# Patient Record
Sex: FEMALE | Race: WHITE | Employment: OTHER | URBAN - METROPOLITAN AREA
[De-identification: names, ages, dates, MRNs, and addresses within clinical notes are randomized per-mention and may not be internally consistent; named-entity substitution may affect disease eponyms.]

---

## 2017-01-13 RX ORDER — DULOXETIN HYDROCHLORIDE 30 MG/1
CAPSULE, DELAYED RELEASE ORAL
Qty: 30 CAP | Refills: 5 | Status: SHIPPED | OUTPATIENT
Start: 2017-01-13 | End: 2017-07-08 | Stop reason: SDUPTHER

## 2017-07-08 ENCOUNTER — OFFICE VISIT (OUTPATIENT)
Dept: NEUROLOGY | Age: 82
End: 2017-07-08

## 2017-07-08 VITALS
BODY MASS INDEX: 26.2 KG/M2 | DIASTOLIC BLOOD PRESSURE: 82 MMHG | SYSTOLIC BLOOD PRESSURE: 130 MMHG | OXYGEN SATURATION: 97 % | TEMPERATURE: 98.7 F | HEART RATE: 72 BPM | WEIGHT: 163 LBS | RESPIRATION RATE: 20 BRPM | HEIGHT: 66 IN

## 2017-07-08 DIAGNOSIS — M79.2 NEUROPATHIC PAIN: Primary | ICD-10-CM

## 2017-07-08 DIAGNOSIS — G60.9 HEREDITARY AND IDIOPATHIC PERIPHERAL NEUROPATHY: ICD-10-CM

## 2017-07-08 RX ORDER — DULOXETIN HYDROCHLORIDE 30 MG/1
CAPSULE, DELAYED RELEASE ORAL
Qty: 30 CAP | Refills: 11 | Status: SHIPPED | OUTPATIENT
Start: 2017-07-08 | End: 2018-05-02 | Stop reason: SDUPTHER

## 2017-07-08 NOTE — MR AVS SNAPSHOT
Visit Information Date & Time Provider Department Dept. Phone Encounter #  
 7/8/2017 10:45 AM Jon Madison MD [facility] [phone] [id number] Your Appointments 7/9/2018 11:40 AM  
Follow Up with Jon Madison MD  
[facility]) Appt Note: follow up neuropathy  $0CP  dariusz  7/8/17 [place] [address] [facility] [address] [place] T[place]ar [id number] [phone]  
  
   
 [place] [address] [address] [address] [place] Upcoming Health Maintenance Date Due  
 LIPID PANEL Q1 7/23/1933 FOOT EXAM Q1 7/23/1943 MICROALBUMIN Q1 7/23/1943 EYE EXAM RETINAL OR DILATED Q1 7/23/1943 DTaP/Tdap/Td series (1 - Tdap) 7/23/1954 ZOSTER VACCINE AGE 60> 7/23/1993 GLAUCOMA SCREENING Q2Y 7/23/1998 OSTEOPOROSIS SCREENING (DEXA) 7/23/1998 Pneumococcal 65+ Low/Medium Risk (1 of 2 - PCV13) 7/23/1998 MEDICARE YEARLY EXAM 7/23/1998 HEMOGLOBIN A1C Q6M 8/15/2013 INFLUENZA AGE 9 TO ADULT 8/1/2017 Allergies as of 7/8/2017  Review Complete On: 7/8/2017 By: Jon Madison MD  
  
 Severity Noted Reaction Type Reactions Novocain [Procaine]  06/27/2014    Other (comments) FAINTS Current Immunizations  Never Reviewed No immunizations on file. Not reviewed this visit Vitals BP Pulse Temp Resp Height(growth percentile) Weight(growth percentile) 130/82 72 98.7 °F (37.1 °C) (Oral) 20 5' 5.5\" (1.664 m) 163 lb (73.9 kg) SpO2 BMI OB Status Smoking Status 97% 26.71 kg/m2 Postmenopausal Former Smoker Vitals History BMI and BSA Data Body Mass Index Body Surface Area  
 26.71 kg/m 2 1.85 m 2 Preferred Pharmacy Pharmacy Name Phone [facility] [address], [address] Your Updated Medication List  
  
   
This list is accurate as of: 7/8/17 11:08 AM.  Always use your most recent med list.  
  
  
  
  
 aspirin delayed-release 81 mg tablet Take 81 mg by mouth daily. CALCIUM PO Take  by mouth as directed. COZAAR 100 mg tablet Generic drug:  losartan Take 100 mg by mouth daily. CRESTOR 20 mg tablet Generic drug:  rosuvastatin Take 20 mg by mouth nightly. diclofenac 1 % Gel Commonly known as:  VOLTAREN Apply  to affected area four (4) times daily. DULoxetine 30 mg capsule Commonly known as:  CYMBALTA TAKE 1 CAP BY MOUTH DAILY. Indications: NEUROPATHIC PAIN  
  
 FISH OIL 1,000 mg Cap Generic drug:  omega-3 fatty acids-vitamin e Take 1 Cap by mouth every seven (7) days. gabapentin 300 mg capsule Commonly known as:  NEURONTIN  
1 WITH BREAKFAST, 1 WITH LUNCH, 2 WITH DINNER AND 2 AT BEDTIME  
  
 hydroCHLOROthiazide 12.5 mg tablet Commonly known as:  HYDRODIURIL Take 12.5 mg by mouth daily. l-methylfolate-vit b12-vit b6 2.8-2-25 mg Tab Commonly known as:  Lucia [place] Take 2.8 mg by mouth two (2) times a day. NexIUM 40 mg capsule Generic drug:  esomeprazole Take 40 mg by mouth daily. VITAMIN D3 1,000 unit tablet Generic drug:  cholecalciferol Take 1,000 Units by mouth Before breakfast, lunch, and dinner. Patient Instructions PRESCRIPTION REFILL POLICY [facility] Statement to Patients April 1, 2014 In an effort to ensure the large volume of patient prescription refills is processed in the most efficient and expeditious manner, we are asking our patients to assist us by calling your Pharmacy for all prescription refills, this will include also your  Mail Order Pharmacy. The pharmacy will contact our office electronically to continue the refill process. Please do not wait until the last minute to call your pharmacy. We need at least 48 hours (2days) to fill prescriptions.  We also encourage you to call your pharmacy before going to  your prescription to make sure it is ready. With regard to controlled substance prescription refill requests (narcotic refills) that need to be picked up at our office, we ask your cooperation by providing us with at least 72 hours (3days) notice that you will need a refill. We will not refill narcotic prescription refill requests after 4:00pm on any weekday, Monday through Thursday, or after 2:00pm on Fridays, or on the weekends. We encourage everyone to explore another way of getting your prescription refill request processed using pinion-pins, our patient web portal through our electronic medical record system. pinion-pins is an efficient and effective way to communicate your medication request directly to the office and  downloadable as an teodora on your smart phone . pinion-pins also features a review functionality that allows you to view your medication list as well as leave messages for your physician. Are you ready to get connected? If so please review the attatched instructions or speak to any of our staff to get you set up right away! Thank you so much for your cooperation. Should you have any questions please contact our Practice Administrator. The Physicians and Staff,  [facility] Yamilet Whitehead [address] What is a living will? A living will is a legal form you use to write down the kind of care you want at the end of your life. It is used by the health professionals who will treat you if you aren't able to decide for yourself. If you put your wishes in writing, your loved ones and others will know what kind of care you want. They won't need to guess. This can ease your mind and be helpful to others. A living will is not the same as an estate or property will. An estate will explains what you want to happen with your money and property after you die. Is a living will a legal document? A living will is a legal document. Each state has its own laws about living pineda. If you move to another state, make sure that your living will is legal in the state where you now live. Or you might use a universal form that has been approved by many states. This kind of form can sometimes be completed and stored online. Your electronic copy will then be available wherever you have a connection to the Internet. In most cases, doctors will respect your wishes even if you have a form from a different state. · You don't need an  to complete a living will. But legal advice can be helpful if your state's laws are unclear, your health history is complicated, or your family can't agree on what should be in your living will. · You can change your living will at any time. Some people find that their wishes about end-of-life care change as their health changes. · In addition to making a living will, think about completing a medical power of  form. This form lets you name the person you want to make end-of-life treatment decisions for you (your \"health care agent\") if you're not able to. Many hospitals and nursing homes will give you the forms you need to complete a living will and a medical power of . · Your living will is used only if you can't make or communicate decisions for yourself anymore. If you become able to make decisions again, you can accept or refuse any treatment, no matter what you wrote in your living will. · Your state may offer an online registry. This is a place where you can store your living will online so the doctors and nurses who need to treat you can find it right away. What should you think about when creating a living will? Talk about your end-of-life wishes with your family members and your doctor. Let them know what you want. That way the people making decisions for you won't be surprised by your choices. Think about these questions as you make your living will: · Do you know enough about life support methods that might be used? If not, talk to your doctor so you know what might be done if you can't breathe on your own, your heart stops, or you're unable to swallow. · What things would you still want to be able to do after you receive life-support methods? Would you want to be able to walk? To speak? To eat on your own? To live without the help of machines? · If you have a choice, where do you want to be cared for? In your home? At a hospital or nursing home? · Do you want certain Sikh practices performed if you become very ill? · If you have a choice at the end of your life, where would you prefer to die? At home? In a hospital or nursing home? Somewhere else? · Would you prefer to be buried or cremated? · Do you want your organs to be donated after you die? What should you do with your living will? · Make sure that your family members and your health care agent have copies of your living will. · Give your doctor a copy of your living will to keep in your medical record. If you have more than one doctor, make sure that each one has a copy. · You may want to put a copy of your living will where it can be easily found. Where can you learn more? Go to http://natalie-celeste.info/. Enter G177 in the search box to learn more about \"Learning About Living Lorenzo White. \" Current as of: August 8, 2016 Content Version: 11.3 © 2794-3872 [employer]. Care instructions adapted under license by [employer] (which disclaims liability or warranty for this information). If you have questions about a medical condition or this instruction, always ask your healthcare professional. [address] any warranty or liability for your use of this information. Introducing [facility]!    
 [facility] introduces Helmedix patient portal. Now you can access parts of your medical record, email your doctor's office, and request medication refills online. 1. In your internet browser, go to [email or web address] 2. Click on the First Time User? Click Here link in the Sign In box. You will see the New Member Sign Up page. 3. Enter your SpazioDati Access Code exactly as it appears below. You will not need to use this code after youve completed the sign-up process. If you do not sign up before the expiration date, you must request a new code. · SpazioDati Access Code: [email or web address] Expires: 10/6/2017 10:22 AM 
 
4. Enter the last four digits of your Social Security Number (xxxx) and Date of Birth (mm/dd/yyyy) as indicated and click Submit. You will be taken to the next sign-up page. 5. Create a SpazioDati ID. This will be your SpazioDati login ID and cannot be changed, so think of one that is secure and easy to remember. 6. Create a SpazioDati password. You can change your password at any time. 7. Enter your Password Reset Question and Answer. This can be used at a later time if you forget your password. 8. Enter your e-mail address. You will receive e-mail notification when new information is available in [address] 9. Click Sign Up. You can now view and download portions of your medical record. 10. Click the Download Summary menu link to download a portable copy of your medical information. If you have questions, please visit the Frequently Asked Questions section of the SpazioDati website. Remember, SpazioDati is NOT to be used for urgent needs. For medical emergencies, dial 911. Now available from your iPhone and Android! Please provide this summary of care documentation to your next provider. Your primary care clinician is listed as Arik May. If you have any questions after today's visit, please call [phone].

## 2017-07-08 NOTE — PROGRESS NOTES
[address] [facility]   [place] [address]   [address]   [phone] Office    Fax         PT SEEN AT [facility]      Chief Complaint   Patient presents with    Peripheral Neuropathy     BLE numbness/tingling continues    Shoulder Pain     left shoulder     Current Outpatient Prescriptions   Medication Sig Dispense Refill    DULoxetine (CYMBALTA) 30 mg capsule TAKE 1 CAP BY MOUTH DAILY. 30 Cap 0    gabapentin (NEURONTIN) 300 mg capsule 1 WITH BREAKFAST, 1 WITH LUNCH, 2 WITH DINNER AND 2 AT BEDTIME 180 Cap 10    diclofenac (VOLTAREN) 1 % gel Apply  to affected area four (4) times daily. 2 Each 0    esomeprazole (NEXIUM) 40 mg capsule Take 40 mg by mouth daily.  aspirin delayed-release 81 mg tablet Take 81 mg by mouth daily.  rosuvastatin (CRESTOR) 20 mg tablet Take 20 mg by mouth nightly.  cholecalciferol (VITAMIN D3) 1,000 unit tablet Take 1,000 Units by mouth Before breakfast, lunch, and dinner.  Hydrochlorothiazide 12.5 mg tablet Take 12.5 mg by mouth daily.  losartan (COZAAR) 100 mg tablet Take 100 mg by mouth daily.  l-methylfolate-vit b12-vit b6 (METANX) 2.8-2-25 mg Tab Take 2.8 mg by mouth two (2) times a day. 24 Tab 11    CALCIUM PO Take  by mouth as directed.  omega-3 fatty acids-vitamin e (FISH OIL) 1,000 mg cap Take 1 Cap by mouth every seven (7) days. Allergies   Allergen Reactions    Novocain [Procaine] Other (comments)     FAINTS     Social History   Substance Use Topics    Smoking status: Former Smoker     Packs/day: 1.50     Years: 40.00     Quit date: 6/27/2000    Smokeless tobacco: Never Used    Alcohol use 0.5 oz/week     1 Glasses of wine per week     Mrs. Arvin Guan returns today for follow-up painful peripheral neuropathy. She has been maintained on a combination of Neurontin and Cymbalta for symptomatic relief.   On her last visit she was doing well for control of her symptoms. She also has some Voltaren gel. The Neurontin has in the past made her a bit groggy. She has been trying to decrease the dose of that somewhat. She was taking 5 daily. She returns today noting her symptoms are about the same. Overall she is doing quite well. Examination  Visit Vitals    Ht 5' 5.5\" (1.664 m)    Wt 73.9 kg (163 lb)    BMI 26.71 kg/m2     She is a very nice lady. She is awake alert oriented conversant. We had a nice conversation today. She has no motor focality. Her cranial nerves are intact. Cognitively she is completely intact. That she has graded sensory loss to above the ankles and that is consistent with previous exams. Her gait is steady. She has no ataxia. Impression/Plan  Neuropathic pain secondary to peripheral neuropathy. Her symptoms are controlled and she has been able to decrease her amount of Neurontin which is good. She is tolerating this as well as the Cymbalta. She also has some Voltaren gel if needed. We will continue this. Follow in 1 year unless circumstances dictate otherwise per      This note was created using voice recognition software. Despite editing, there may be syntax errors. This note will not be viewable in [address]

## 2017-07-08 NOTE — PATIENT INSTRUCTIONS
[facility]   Statement to Patients  April 1, 2014      In an effort to ensure the large volume of patient prescription refills is processed in the most efficient and expeditious manner, we are asking our patients to assist us by calling your Pharmacy for all prescription refills, this will include also your  Mail Order Pharmacy. The pharmacy will contact our office electronically to continue the refill process. Please do not wait until the last minute to call your pharmacy. We need at least 48 hours (2days) to fill prescriptions. We also encourage you to call your pharmacy before going to  your prescription to make sure it is ready. With regard to controlled substance prescription refill requests (narcotic refills) that need to be picked up at our office, we ask your cooperation by providing us with at least 72 hours (3days) notice that you will need a refill. We will not refill narcotic prescription refill requests after 4:00pm on any weekday, Monday through Thursday, or after 2:00pm on Fridays, or on the weekends. We encourage everyone to explore another way of getting your prescription refill request processed using MightyHive, our patient web portal through our electronic medical record system. MightyHive is an efficient and effective way to communicate your medication request directly to the office and  downloadable as an teodora on your smart phone . MightyHive also features a review functionality that allows you to view your medication list as well as leave messages for your physician. Are you ready to get connected? If so please review the attatched instructions or speak to any of our staff to get you set up right away! Thank you so much for your cooperation. Should you have any questions please contact our Practice Administrator.     The Physicians and Staff,  [facility] [address]  What is a living will?  A living will is a legal form you use to write down the kind of care you want at the end of your life. It is used by the health professionals who will treat you if you aren't able to decide for yourself. If you put your wishes in writing, your loved ones and others will know what kind of care you want. They won't need to guess. This can ease your mind and be helpful to others. A living will is not the same as an estate or property will. An estate will explains what you want to happen with your money and property after you die. Is a living will a legal document? A living will is a legal document. Each state has its own laws about living pineda. If you move to another state, make sure that your living will is legal in the state where you now live. Or you might use a universal form that has been approved by many states. This kind of form can sometimes be completed and stored online. Your electronic copy will then be available wherever you have a connection to the Internet. In most cases, doctors will respect your wishes even if you have a form from a different state. · You don't need an  to complete a living will. But legal advice can be helpful if your state's laws are unclear, your health history is complicated, or your family can't agree on what should be in your living will. · You can change your living will at any time. Some people find that their wishes about end-of-life care change as their health changes. · In addition to making a living will, think about completing a medical power of  form. This form lets you name the person you want to make end-of-life treatment decisions for you (your \"health care agent\") if you're not able to. Many hospitals and nursing homes will give you the forms you need to complete a living will and a medical power of . · Your living will is used only if you can't make or communicate decisions for yourself anymore.  If you become able to make decisions again, you can accept or refuse any treatment, no matter what you wrote in your living will. · Your state may offer an online registry. This is a place where you can store your living will online so the doctors and nurses who need to treat you can find it right away. What should you think about when creating a living will? Talk about your end-of-life wishes with your family members and your doctor. Let them know what you want. That way the people making decisions for you won't be surprised by your choices. Think about these questions as you make your living will:  · Do you know enough about life support methods that might be used? If not, talk to your doctor so you know what might be done if you can't breathe on your own, your heart stops, or you're unable to swallow. · What things would you still want to be able to do after you receive life-support methods? Would you want to be able to walk? To speak? To eat on your own? To live without the help of machines? · If you have a choice, where do you want to be cared for? In your home? At a hospital or nursing home? · Do you want certain Faith practices performed if you become very ill? · If you have a choice at the end of your life, where would you prefer to die? At home? In a hospital or nursing home? Somewhere else? · Would you prefer to be buried or cremated? · Do you want your organs to be donated after you die? What should you do with your living will? · Make sure that your family members and your health care agent have copies of your living will. · Give your doctor a copy of your living will to keep in your medical record. If you have more than one doctor, make sure that each one has a copy. · You may want to put a copy of your living will where it can be easily found. Where can you learn more? Go to http://natalie-celeste.info/. Enter H877 in the search box to learn more about \"Learning About Living Perradha. \"  Current as of: August 8, 2016  Content Version: 11.3  © 6197-1036 [employer]. Care instructions adapted under license by [employer] (which disclaims liability or warranty for this information). If you have questions about a medical condition or this instruction, always ask your healthcare professional. [address] any warranty or liability for your use of this information.

## 2017-07-08 NOTE — PROGRESS NOTES
[address] [facility]   [place] [address] [facility] [address]   Mika Khanna 57    Office    Fax         PT SEEN AT [facility]      Chief Complaint   Patient presents with    Peripheral Neuropathy     BLE numbness/tingling continues    Shoulder Pain     left shoulder     Current Outpatient Prescriptions   Medication Sig Dispense Refill    DULoxetine (CYMBALTA) 30 mg capsule TAKE 1 CAP BY MOUTH DAILY. 30 Cap 0    gabapentin (NEURONTIN) 300 mg capsule 1 WITH BREAKFAST, 1 WITH LUNCH, 2 WITH DINNER AND 2 AT BEDTIME 180 Cap 10    diclofenac (VOLTAREN) 1 % gel Apply  to affected area four (4) times daily. 2 Each 0    esomeprazole (NEXIUM) 40 mg capsule Take 40 mg by mouth daily.  aspirin delayed-release 81 mg tablet Take 81 mg by mouth daily.  rosuvastatin (CRESTOR) 20 mg tablet Take 20 mg by mouth nightly.  cholecalciferol (VITAMIN D3) 1,000 unit tablet Take 1,000 Units by mouth Before breakfast, lunch, and dinner.  Hydrochlorothiazide 12.5 mg tablet Take 12.5 mg by mouth daily.  losartan (COZAAR) 100 mg tablet Take 100 mg by mouth daily.  l-methylfolate-vit b12-vit b6 (METANX) 2.8-2-25 mg Tab Take 2.8 mg by mouth two (2) times a day. 24 Tab 11    CALCIUM PO Take  by mouth as directed.  omega-3 fatty acids-vitamin e (FISH OIL) 1,000 mg cap Take 1 Cap by mouth every seven (7) days. Allergies   Allergen Reactions    Novocain [Procaine] Other (comments)     FAINTS     Social History   Substance Use Topics    Smoking status: Former Smoker     Packs/day: 1.50     Years: 40.00     Quit date: 6/27/2000    Smokeless tobacco: Never Used    Alcohol use 0.5 oz/week     1 Glasses of wine per week     Mrs. Libby Louise returns today for follow-up painful peripheral neuropathy. She has been maintained on a combination of Neurontin and Cymbalta for symptomatic relief.   On her last visit she was doing well for control of her symptoms. She returns today noting    Examination  Visit Vitals    Ht 5' 5.5\" (1.664 m)    Wt 73.9 kg (163 lb)    BMI 26.71 kg/m2         Impression/Plan  Neuropathic pain secondary to peripheral neuropathy. This note was created using voice recognition software. Despite editing, there may be syntax errors. This note will not be viewable in [address]

## 2018-02-23 RX ORDER — GABAPENTIN 300 MG/1
CAPSULE ORAL
Qty: 540 CAP | Refills: 2 | Status: SHIPPED | OUTPATIENT
Start: 2018-02-23 | End: 2018-08-21 | Stop reason: SDUPTHER

## 2018-05-02 RX ORDER — DULOXETIN HYDROCHLORIDE 30 MG/1
CAPSULE, DELAYED RELEASE ORAL
Qty: 90 CAP | Refills: 0 | Status: SHIPPED | OUTPATIENT
Start: 2018-05-02 | End: 2018-07-09 | Stop reason: SDUPTHER

## 2018-06-28 ENCOUNTER — TELEPHONE (OUTPATIENT)
Dept: NEUROLOGY | Age: 83
End: 2018-06-28

## 2018-06-28 NOTE — TELEPHONE ENCOUNTER
Left message to call , need to verify that refills are needed for  Cymbalta HCL 30 mg  , last refill was 5/18 , 90 days

## 2018-07-09 RX ORDER — DULOXETIN HYDROCHLORIDE 30 MG/1
CAPSULE, DELAYED RELEASE ORAL
Qty: 90 CAP | Refills: 0 | Status: SHIPPED | OUTPATIENT
Start: 2018-07-09 | End: 2018-07-16 | Stop reason: SDUPTHER

## 2018-07-09 NOTE — TELEPHONE ENCOUNTER
VO Cymbalta 30 mg cap , 90 , 0 refills.  Pt was last seen on 7/8/17 and pt has up coming appt on 7/16/18

## 2018-07-16 ENCOUNTER — OFFICE VISIT (OUTPATIENT)
Dept: NEUROLOGY | Age: 83
End: 2018-07-16

## 2018-07-16 VITALS
WEIGHT: 170 LBS | TEMPERATURE: 61 F | OXYGEN SATURATION: 96 % | BODY MASS INDEX: 27.32 KG/M2 | HEIGHT: 66 IN | SYSTOLIC BLOOD PRESSURE: 110 MMHG | DIASTOLIC BLOOD PRESSURE: 70 MMHG

## 2018-07-16 DIAGNOSIS — G60.9 HEREDITARY AND IDIOPATHIC PERIPHERAL NEUROPATHY: ICD-10-CM

## 2018-07-16 DIAGNOSIS — M79.2 NEUROPATHIC PAIN: Primary | ICD-10-CM

## 2018-07-16 DIAGNOSIS — R27.8 SENSORY ATAXIA: ICD-10-CM

## 2018-07-16 RX ORDER — DULOXETIN HYDROCHLORIDE 60 MG/1
CAPSULE, DELAYED RELEASE ORAL
Qty: 90 CAP | Refills: 3 | Status: SHIPPED | OUTPATIENT
Start: 2018-07-16 | End: 2020-09-16

## 2018-07-16 NOTE — PROGRESS NOTES
[address] [facility]   [place] [address]   Mika Khanna 57    [facility]   [phone] Fax               Chief Complaint   Patient presents with    Peripheral Neuropathy      follow up      Current Outpatient Prescriptions   Medication Sig Dispense Refill    DULoxetine (CYMBALTA) 30 mg capsule TAKE 1 CAP BY MOUTH DAILY. Indications: NEUROPATHIC PAIN 90 Cap 0    gabapentin (NEURONTIN) 300 mg capsule 2 BY MOUTH AM, 2 TAB BY MOUTH AT NOON, 2 BY MOUTH AT SUPPER, AND 2 BY MOUTH AT BEDTIME (Patient taking differently: 2 BY MOUTH AM, 2 TAB BY MOUTH AT NOON, 2 BY MOUTH AT SUPPER, AND 2 BY MOUTH) 540 Cap 2    diclofenac (VOLTAREN) 1 % gel Apply  to affected area four (4) times daily. 2 Each 0    esomeprazole (NEXIUM) 40 mg capsule Take 40 mg by mouth daily.  omega-3 fatty acids-vitamin e (FISH OIL) 1,000 mg cap Take 1 Cap by mouth every seven (7) days.  aspirin delayed-release 81 mg tablet Take 81 mg by mouth daily.  rosuvastatin (CRESTOR) 20 mg tablet Take 20 mg by mouth nightly.  cholecalciferol (VITAMIN D3) 1,000 unit tablet Take 1,000 Units by mouth Before breakfast, lunch, and dinner.  Hydrochlorothiazide 12.5 mg tablet Take 12.5 mg by mouth daily.  losartan (COZAAR) 100 mg tablet Take 100 mg by mouth daily.  l-methylfolate-vit b12-vit b6 (METANX) 2.8-2-25 mg Tab Take 2.8 mg by mouth two (2) times a day. 24 Tab 11    CALCIUM PO Take  by mouth as directed. Allergies   Allergen Reactions    Novocain [Procaine] Other (comments)     FAINTS     Social History   Substance Use Topics    Smoking status: Former Smoker     Packs/day: 1.50     Years: 40.00     Quit date: 6/27/2000    Smokeless tobacco: Never Used    Alcohol use 0.5 oz/week     1 Glasses of wine per week     Very nice lady returns today for follow-up painful peripheral neuropathy.   She has been maintained on Neurontin and we decreased the dose last visit because it was making her a bit somnolent. She is taking 5 capsules daily divided throughout the day. Also on 30 mg of Cymbalta. She indicates that she is having some increasing pain. She continues to be active. No loss or alteration in consciousness. No dizziness. No focal weakness. No fall. She does indicate that she is quite careful. Examination  Visit Vitals    /70    Temp (!) 61 °F (16.1 °C)    Ht 5' 5.5\" (1.664 m)    Wt 77.1 kg (170 lb)    SpO2 96%    BMI 27.86 kg/m2     Awake alert and oriented. Very pleasant lady with whom is quite nice to converse. She has no nystagmus. No ataxia. Graded sensory loss to about midshin in the right lower extremity and to just above the ankle and the left. Wide-based stance. Romberg sign is present. Impression/Plan  Painful peripheral neuropathy with sensory ataxia. Discussed options regarding her increased pain. Given the fact that she was somnolent with the Neurontin we decided to increase Cymbalta to a dose of 60 mg daily. Continue the same Neurontin. As long as this helps her discomfort we will keep her yearly visits but she certainly knows to call me if this is not taking care of the issue or if she has trouble with the higher dose of Cymbalta. She has a good supply of the 30 mg capsules at home so she will start taking 2 of those and I gave her a written prescription for the 60 mg capsules that she can fill when it is time so as to avoid being wasteful. Ron Bloch, MD      This note was created using voice recognition software. Despite editing, there may be syntax errors. This note will not be viewable in [address]

## 2018-07-16 NOTE — MR AVS SNAPSHOT
Olivia Mancusoudder 
 
 
 [facility] [address] [facility] [address] [facility] [id number] [phone] Patient: Tejal Martinez MRN: [id number] LCF:[birth year] Visit Information Date & Time Provider Department Dept. Phone Encounter #  
 7/16/2018  5:15 PM Kvng Rivas MD [facility] [phone] [id number] Follow-up Instructions Return in about 1 year (around 7/16/2019). Upcoming Health Maintenance Date Due  
 LIPID PANEL Q1 7/23/1933 FOOT EXAM Q1 7/23/1943 MICROALBUMIN Q1 7/23/1943 EYE EXAM RETINAL OR DILATED Q1 7/23/1943 DTaP/Tdap/Td series (1 - Tdap) 7/23/1954 ZOSTER VACCINE AGE 60> 5/23/1993 GLAUCOMA SCREENING Q2Y 7/23/1998 Bone Densitometry (Dexa) Screening 7/23/1998 Pneumococcal 65+ Low/Medium Risk (1 of 2 - PCV13) 7/23/1998 HEMOGLOBIN A1C Q6M 8/15/2013 MEDICARE YEARLY EXAM 3/14/2018 Influenza Age 5 to Adult 8/1/2018 Allergies as of 7/16/2018  Review Complete On: 7/16/2018 By: Kvng Rivas MD  
  
 Severity Noted Reaction Type Reactions Novocain [Procaine]  06/27/2014    Other (comments) FAINTS Current Immunizations  Never Reviewed No immunizations on file. Not reviewed this visit Vitals BP Temp Height(growth percentile) Weight(growth percentile) SpO2 BMI  
 110/70 (!) 61 °F (16.1 °C) 5' 5.5\" (1.664 m) 170 lb (77.1 kg) 96% 27.86 kg/m2 OB Status Smoking Status Postmenopausal Former Smoker BMI and BSA Data Body Mass Index Body Surface Area  
 27.86 kg/m 2 1.89 m 2 Preferred Pharmacy Pharmacy Name Phone [facility] [address], [address] Your Updated Medication List  
  
   
This list is accurate as of 7/16/18  6:02 PM.  Always use your most recent med list.  
  
  
  
  
 aspirin delayed-release 81 mg tablet Take 81 mg by mouth daily. CALCIUM PO Take  by mouth as directed. COZAAR 100 mg tablet Generic drug:  losartan Take 100 mg by mouth daily. CRESTOR 20 mg tablet Generic drug:  rosuvastatin Take 20 mg by mouth nightly. diclofenac 1 % Gel Commonly known as:  VOLTAREN Apply  to affected area four (4) times daily. DULoxetine 60 mg capsule Commonly known as:  CYMBALTA TAKE 1 CAP BY MOUTH DAILY. Indications: NEUROPATHIC PAIN  
  
 FISH OIL 1,000 mg Cap Generic drug:  omega-3 fatty acids-vitamin e Take 1 Cap by mouth every seven (7) days. gabapentin 300 mg capsule Commonly known as:  NEURONTIN  
2 BY MOUTH AM, 2 TAB BY MOUTH AT NOON, 2 BY MOUTH AT SUPPER, AND 2 BY MOUTH AT BEDTIME  
  
 hydroCHLOROthiazide 12.5 mg tablet Commonly known as:  HYDRODIURIL Take 12.5 mg by mouth daily. l-methylfolate-vit b12-vit b6 2.8-2-25 mg Tab Commonly known as:  Merilynn Corti Take 2.8 mg by mouth two (2) times a day. NexIUM 40 mg capsule Generic drug:  esomeprazole Take 40 mg by mouth daily. VITAMIN D3 1,000 unit tablet Generic drug:  cholecalciferol Take 1,000 Units by mouth Before breakfast, lunch, and dinner. Prescriptions Printed Refills DULoxetine (CYMBALTA) 60 mg capsule 3 Sig: TAKE 1 CAP BY MOUTH DAILY. Indications: NEUROPATHIC PAIN Class: Print Follow-up Instructions Return in about 1 year (around 7/16/2019). Introducing [facility]! [employer] introduces [employer] patient portal. Now you can access parts of your medical record, email your doctor's office, and request medication refills online. 1. In your internet browser, go to [email or web address] 2. Click on the First Time User? Click Here link in the Sign In box. You will see the New Member Sign Up page. 3. Enter your [employer] Access Code exactly as it appears below. You will not need to use this code after youve completed the sign-up process.  If you do not sign up before the expiration date, you must request a new code. · [employer] Access Code: [email or web address] Expires: 10/14/2018  4:47 PM 
 
4. Enter the last four digits of your Social Security Number (xxxx) and Date of Birth (mm/dd/yyyy) as indicated and click Submit. You will be taken to the next sign-up page. 5. Create a [employer] ID. This will be your [employer] login ID and cannot be changed, so think of one that is secure and easy to remember. 6. Create a [employer] password. You can change your password at any time. 7. Enter your Password Reset Question and Answer. This can be used at a later time if you forget your password. 8. Enter your e-mail address. You will receive e-mail notification when new information is available in [address] 9. Click Sign Up. You can now view and download portions of your medical record. 10. Click the Download Summary menu link to download a portable copy of your medical information. If you have questions, please visit the Frequently Asked Questions section of the [employer] website. Remember, [employer] is NOT to be used for urgent needs. For medical emergencies, dial 911. Now available from your iPhone and Android! Please provide this summary of care documentation to your next provider. Your primary care clinician is listed as Rob Joe. If you have any questions after today's visit, please call [phone].

## 2018-07-16 NOTE — PROGRESS NOTES
Patient to follow up on neuropathy , still has pain in middle toe on right foot , sharp pain that comes goes , numbness in both feet. Unaware at times where feet are. Pt states now she seems to be dropping objects . Reports no falls.

## 2018-08-21 RX ORDER — GABAPENTIN 300 MG/1
CAPSULE ORAL
Qty: 540 CAP | Refills: 2 | Status: SHIPPED | OUTPATIENT
Start: 2018-08-21 | End: 2020-04-03 | Stop reason: SDUPTHER

## 2018-10-05 RX ORDER — DULOXETIN HYDROCHLORIDE 30 MG/1
CAPSULE, DELAYED RELEASE ORAL
Qty: 90 CAP | Refills: 0 | Status: SHIPPED | OUTPATIENT
Start: 2018-10-05 | End: 2019-01-10 | Stop reason: SDUPTHER

## 2019-01-10 RX ORDER — DULOXETIN HYDROCHLORIDE 30 MG/1
CAPSULE, DELAYED RELEASE ORAL
Qty: 90 CAP | Refills: 0 | Status: SHIPPED | OUTPATIENT
Start: 2019-01-10 | End: 2019-05-04 | Stop reason: SDUPTHER

## 2019-02-07 RX ORDER — GABAPENTIN 300 MG/1
CAPSULE ORAL
Qty: 540 CAP | Refills: 2 | Status: SHIPPED | OUTPATIENT
Start: 2019-02-07 | End: 2019-08-31 | Stop reason: SDUPTHER

## 2019-05-04 RX ORDER — DULOXETIN HYDROCHLORIDE 30 MG/1
CAPSULE, DELAYED RELEASE ORAL
Qty: 90 CAP | Refills: 0 | Status: SHIPPED | OUTPATIENT
Start: 2019-05-04 | End: 2019-07-12 | Stop reason: SDUPTHER

## 2019-07-12 DIAGNOSIS — M79.2 NEUROPATHIC PAIN: Primary | ICD-10-CM

## 2019-07-12 RX ORDER — DULOXETIN HYDROCHLORIDE 30 MG/1
CAPSULE, DELAYED RELEASE ORAL
Qty: 90 CAP | Refills: 0 | Status: SHIPPED | OUTPATIENT
Start: 2019-07-12 | End: 2020-04-02

## 2019-08-31 DIAGNOSIS — M79.2 NEUROPATHIC PAIN: Primary | ICD-10-CM

## 2019-08-31 RX ORDER — GABAPENTIN 300 MG/1
CAPSULE ORAL
Qty: 540 CAP | Refills: 1 | Status: SHIPPED | OUTPATIENT
Start: 2019-08-31 | End: 2020-04-03 | Stop reason: SDUPTHER

## 2020-04-02 DIAGNOSIS — M79.2 NEUROPATHIC PAIN: ICD-10-CM

## 2020-04-02 RX ORDER — DULOXETIN HYDROCHLORIDE 30 MG/1
CAPSULE, DELAYED RELEASE ORAL
Qty: 90 CAP | Refills: 0 | Status: SHIPPED | OUTPATIENT
Start: 2020-04-02 | End: 2020-09-16

## 2020-04-02 NOTE — TELEPHONE ENCOUNTER
----- Message from Maco Mejias sent at 4/2/2020  1:51 PM EDT -----  Regarding: Dr Nixon Horne  General Message/Vendor Calls    Caller's first and last name:Jesika[facility]. Reason for call:Caller is reporting that pt Rx for \"Gabapentin 300 mg\" was last filled at [facility] pharmacy, however caller is requesting it to be called in to [facility], [address] [place] [address].  (f) [phone] (p) [phone]      Callback required yes/no and why:yes      Best contact number(s):[phone]      Details to clarify the request:      Maco Mejias

## 2020-04-03 RX ORDER — GABAPENTIN 300 MG/1
CAPSULE ORAL
Qty: 720 CAP | Refills: 0 | Status: SHIPPED | OUTPATIENT
Start: 2020-04-03 | End: 2020-06-28

## 2020-06-28 DIAGNOSIS — M79.2 NEUROPATHIC PAIN: ICD-10-CM

## 2020-06-28 RX ORDER — GABAPENTIN 300 MG/1
CAPSULE ORAL
Qty: 720 CAP | Refills: 0 | Status: SHIPPED | OUTPATIENT
Start: 2020-06-28 | End: 2020-09-16 | Stop reason: SDUPTHER

## 2020-09-14 ENCOUNTER — TELEPHONE (OUTPATIENT)
Dept: NEUROLOGY | Age: 85
End: 2020-09-14

## 2020-09-14 NOTE — TELEPHONE ENCOUNTER
LM on  to call office as patient appt.  For 1:30pm on 9/16/20 needs to be rescheduled to another date, or we can see the patient at 11:30am.

## 2020-09-16 ENCOUNTER — OFFICE VISIT (OUTPATIENT)
Dept: NEUROLOGY | Age: 85
End: 2020-09-16
Payer: MEDICARE

## 2020-09-16 VITALS
DIASTOLIC BLOOD PRESSURE: 58 MMHG | RESPIRATION RATE: 14 BRPM | SYSTOLIC BLOOD PRESSURE: 126 MMHG | BODY MASS INDEX: 27.48 KG/M2 | HEIGHT: 66 IN | WEIGHT: 171 LBS | TEMPERATURE: 97.3 F | HEART RATE: 52 BPM | OXYGEN SATURATION: 99 %

## 2020-09-16 DIAGNOSIS — M79.2 NEUROPATHIC PAIN: Primary | ICD-10-CM

## 2020-09-16 PROCEDURE — 1101F PT FALLS ASSESS-DOCD LE1/YR: CPT | Performed by: PSYCHIATRY & NEUROLOGY

## 2020-09-16 PROCEDURE — G8419 CALC BMI OUT NRM PARAM NOF/U: HCPCS | Performed by: PSYCHIATRY & NEUROLOGY

## 2020-09-16 PROCEDURE — G8536 NO DOC ELDER MAL SCRN: HCPCS | Performed by: PSYCHIATRY & NEUROLOGY

## 2020-09-16 PROCEDURE — G8427 DOCREV CUR MEDS BY ELIG CLIN: HCPCS | Performed by: PSYCHIATRY & NEUROLOGY

## 2020-09-16 PROCEDURE — 99213 OFFICE O/P EST LOW 20 MIN: CPT | Performed by: PSYCHIATRY & NEUROLOGY

## 2020-09-16 PROCEDURE — 1090F PRES/ABSN URINE INCON ASSESS: CPT | Performed by: PSYCHIATRY & NEUROLOGY

## 2020-09-16 PROCEDURE — G8510 SCR DEP NEG, NO PLAN REQD: HCPCS | Performed by: PSYCHIATRY & NEUROLOGY

## 2020-09-16 RX ORDER — LIDOCAINE AND PRILOCAINE 25; 25 MG/G; MG/G
CREAM TOPICAL AS NEEDED
Qty: 30 G | Refills: 5 | Status: SHIPPED | OUTPATIENT
Start: 2020-09-16 | End: 2020-09-29

## 2020-09-16 RX ORDER — GABAPENTIN 300 MG/1
CAPSULE ORAL
Qty: 720 CAP | Refills: 5 | Status: SHIPPED | OUTPATIENT
Start: 2020-09-16 | End: 2021-03-17

## 2020-09-16 NOTE — PROGRESS NOTES
[facility] and  [address] at [facility] at [address] [facility], [address] [facility] [address] [facility], [address] [place] [address]   [phone]              Chief Complaint   Patient presents with    Follow-up     neuropathic pain     Current Outpatient Medications   Medication Sig Dispense Refill    gabapentin (NEURONTIN) 300 mg capsule TAKE 2 CAPSULES BY MOUTH IN THE MORNING, AT NOON, AT SUPPER, AND AT BEDTIME ** NEED APPOINTMENT 720 Cap 0    diclofenac (VOLTAREN) 1 % gel Apply  to affected area four (4) times daily. 2 Each 0    esomeprazole (NEXIUM) 40 mg capsule Take 40 mg by mouth daily.  omega-3 fatty acids-vitamin e (FISH OIL) 1,000 mg cap Take 1 Cap by mouth every seven (7) days.  aspirin delayed-release 81 mg tablet Take 81 mg by mouth daily.  rosuvastatin (CRESTOR) 20 mg tablet Take 20 mg by mouth nightly.  cholecalciferol (VITAMIN D3) 1,000 unit tablet Take 1,000 Units by mouth Before breakfast, lunch, and dinner.  Hydrochlorothiazide 12.5 mg tablet Take 12.5 mg by mouth daily.  losartan (COZAAR) 100 mg tablet Take 100 mg by mouth daily.  l-methylfolate-vit b12-vit b6 (METANX) 2.8-2-25 mg Tab Take 2.8 mg by mouth two (2) times a day. 24 Tab 11    CALCIUM PO Take  by mouth as directed. Allergies   Allergen Reactions    Novocain [Procaine] Other (comments)     FAINTS     Social History     Tobacco Use    Smoking status: Former Smoker     Packs/day: 1.50     Years: 40.00     Pack years: 60.00     Last attempt to quit: 2000     Years since quittin.2    Smokeless tobacco: Never Used   Substance Use Topics    Alcohol use: Yes     Alcohol/week: 0.8 standard drinks     Types: 1 Glasses of wine per week    Drug use: No     51-year-old lady returns today for follow-up neuropathic pain secondary to peripheral neuropathy. She is on Neurontin.   Says that her symptoms are controlled with that except her right great toe will awaken her at night at times. No medication side effects were no focal weakness. No significant change. No fall. Staying busy    Examination  Visit Vitals  BP (!) 126/58 (BP 1 Location: Left arm, BP Patient Position: Sitting)   Pulse (!) 52   Temp 97.3 °F (36.3 °C)   Resp 14   Ht 5' 5.5\" (1.664 m)   Wt 77.6 kg (171 lb)   SpO2 99%   BMI 28.02 kg/m²     Pleasant lady. Awake alert oriented and conversant. Normal speech and language. Normal cognition. Graded sensory loss in the lower extremities bilaterally    Impression/Plan  Neuropathic pain secondary to peripheral neuropathy. Continue current dose of Neurontin. Add some EMLA cream that she can use as she needs for the right great toe to see if that helps. If she does well follow-up in 6 months    Lauryn Birmingham MD      This note was created using voice recognition software. Despite editing, there may be syntax errors. This note will not be viewable in [address]

## 2020-09-16 NOTE — LETTER
9/16/20 Patient: Kenn Schaefer YOB: 1933 Date of Visit: 9/16/2020 Philipp Owens NP 
[address] 
[address] [facility] [address] VIA Facsimile: [phone] Dear Philipp Owens NP, Thank you for referring Ms. Evangelista Rivero to [facility] for evaluation. My notes for this consultation are attached. If you have questions, please do not hesitate to call me. I look forward to following your patient along with you. Sincerely, Esteban May MD

## 2020-09-25 PROBLEM — M17.11 PRIMARY LOCALIZED OSTEOARTHRITIS OF RIGHT KNEE: Status: ACTIVE | Noted: 2020-09-25

## 2020-09-25 NOTE — H&P
Patient ID: Benton Estrada is a 80 y.o. female.     Chief Complaint: Follow-up of the Right Knee and Follow-up of the Left Knee        Benton Estrada is a 80 y.o. female who returns for follow up evaluation of right knee pain symptoms. Patient was last seen 2/24/2020 at which time I administered a steroid injection to the right knee. We have been following her for a long time for bilateral knee pain. Patient reports that she cannot walk more than 100 feet without experiencing severe pain. She complains of pain while doing daily and lawn activities. Patient has received numerous injection but is still concerned about right knee pain. She notes of any pain or discomfort to be a 4/10. Patient does not complain of any other symptoms at this time.     Review of Systems   9/21/2020     Constitutional: Unexplained: Negative  Genitourinary: Frequent Urination: Positive  HEENT: Vision Loss: Negative  Neurological: Memory Loss: Positive  Integumentary: Rash: Negative  Cardiovascular: Palpatations: Negative  Hematologic: Bruises/Bleeds Easily: Positive  Gastrointestinal: Constipation: Negative  Immunological: Seasonal Allergies: Positive  Musculoskeletal: Joint Pain: Negative     Medical History        Past Medical History:   Diagnosis Date    Diabetes mellitus      GERD (gastroesophageal reflux disease)      Hyperlipidemia      Hypertension              Surgical History         Past Surgical History:   Procedure Laterality Date    KNEE SURGERY        NO RELEVANT SURGERIES        SHOULDER SURGERY        SPINE SURGERY                Objective:          Vitals:     09/21/20 1419   BP: 120/80         Constitutional:  No acute distress. Well nourished. Well developed. Eyes:  Sclera are nonicteric. Respiratory:  No labored breathing. Cardiovascular:  No marked edema. Skin:  No marked skin ulcers. Neurological:  No marked sensory loss noted. Psychiatric: Alert and oriented x3.   Musculoskeletal      Examination of the RIGHT knee reveals a ROM of 8-114 degrees. Tenderness: diffuse  Crepitus: diffuse  Any attempts to move the knee is painful  Skin intact. The cruciate and collateral ligaments are stable. No effusion. No sign of infection. No ecchymosis or erythema. No cellulitis or rash. No calf pain, swelling, or other evidence of DVT. I detect no obvious motor or sensory deficits in the lower extremities. The extensor mechanism is intact. The neurovascular status is intact.     Examination of the LEFT knee reveals a ROM of 3-115 degrees. Moderate discomfort to ROM of the left knee  Skin intact. The cruciate and collateral ligaments are stable. No effusion. No sign of infection. No ecchymosis or erythema. No cellulitis or rash. No calf pain, swelling, or other evidence of DVT. I detect no obvious motor or sensory deficits in the lower extremities. The extensor mechanism is intact. The neurovascular status is intact.     Imaging/Studies:    Order: XR KNEE 3 VW RIGHT - Indication: Acute pain of both knees  Order: XR KNEE 3 VW LEFT - Indication: Acute pain of both knees        X-ray Knee Left 3 Views ([id number])     Result Date: 9/22/2020  Weight Bearing. Views: Standing AP, Lat, Hutto.      Impression: I ordered and interpreted x-rays of the LEFT knee which reveal moderate degenerative changes. No fractures or evidence of metastatic disease.     X-ray Knee Right 3 Views ([id number])     Result Date: 9/22/2020  Weight Bearing. Views: Standing AP, Lat, Hutto.      Impression: I ordered and interpreted x-rays of the RIGHT knee which reveal moderate to severe degenerative changes. No fractures or evidence of metastatic disease.        Assessment:   There is no problem list on file for this patient.            ICD-10-CM   1. Primary osteoarthritis of both knees  M17.0   2. Synovitis of right knee  M65.9   3. Synovitis of left knee  M65.9   4.  Acute pain of both knees  M25.561     M25.562         Plan:   I personally reviewed my findings with the patient. I reviewed the pathophysiology and explained that she has moderate to severe degenerative changes of the right knee and moderate degenerative changes of the left knee. We had a lengthy discussion. We have done numerous conservative treatment including arthroscopic debridement. I explained that she is still quite symptomatic with the right knee. I reviewed her previous x-rays that reveals moderate to severe degenerative changes but not severe. Her sister is 80years old with advanced degenerative arthritis but cannot undergo total knee arthroplasty due to her age. She feels that she is healthy and would like to undergo right total knee arthroplasty due to failing conservative treatment. I explained the hospitalization, post-op and rehabilitation for the procedure. We discussed all complications associated with the surgery. She understands the potential risk of infection and that she would be on antibiotics following the surgery. She understands the potential risk of DVT/PE and that she would be on an anticoagulant following surgery. We discussed the course of post-op physical therapy for rehabilitation. PROCEDURE: Right total knee replacement. Date of Surgery Update:  Stephanie Mtz was seen and examined. Past Medical History:   Diagnosis Date    Arthritis     Benign colon polyp 2020    Diabetes (HCC)     Pre-Diabetes - Diet & Exercise Controlled    Diabetes mellitus     GERD (gastroesophageal reflux disease)     Headache(784.0)     High cholesterol     Hypertension     Neuropathy     Osteopenia      Prior to Admission Medications   Prescriptions Last Dose Informant Patient Reported? Taking? Calcium-Cholecalciferol, D3, (Calcium 600 with Vitamin D3) 600 mg(1,500mg) -400 unit cap 10/6/2020 at 1200  Yes Yes   Sig: Take  by mouth. Hydrochlorothiazide 12.5 mg tablet 10/6/2020 at 0800  Yes Yes   Sig: Take 12.5 mg by mouth daily.      cholecalciferol, vitamin D3, (Vitamin D3) 50 mcg (2,000 unit) tab 10/6/2020 at 0800  Yes Yes   Sig: Take 4,000 Units by mouth daily. denosumab (Prolia) 60 mg/mL injection 2020  Yes No   Si mg by SubCUTAneous route every 6 months. diclofenac (VOLTAREN) 1 % gel 2020 at Unknown time  No Yes   Sig: Apply  to affected area four (4) times daily. Patient taking differently: Apply  to affected area four (4) times daily as needed. esomeprazole (NEXIUM) 40 mg capsule 10/7/2020 at 0500  Yes Yes   Sig: Take 40 mg by mouth daily. gabapentin (NEURONTIN) 300 mg capsule 10/7/2020 at 0500  No Yes   Sig: TAKE 2 CAPSULES BY MOUTH IN THE MORNING, AT NOON, AT SUPPER, AND AT BEDTIME   Patient taking differently: BY MOUTH IN THE MORNING, AT NOON, AT SUPPER, AND AT BEDTIME   lactase (LACTAID PO) 2020 at Unknown time  Yes Yes   Sig: Take  by mouth daily as needed. loratadine 10 mg cap 10/7/2020 at 0500  Yes Yes   Sig: Take 1 Tab by mouth daily as needed. losartan (COZAAR) 100 mg tablet 10/6/2020 at 44321  Yes Yes   Sig: Take 100 mg by mouth daily. metFORMIN (GLUCOPHAGE) 1,000 mg tablet 10/6/2020 at 0800  Yes Yes   Sig: Take 1,000 mg by mouth two (2) times daily (with meals). AFTER BREAKFAST AND AFTER SUPPER   multivit-min/iron/folic/lutein (CENTRUM SILVER WOMEN PO) 2020 at Unknown time  Yes Yes   Sig: Take 1 Tab by mouth daily. mupirocin (BACTROBAN) 2 % ointment 10/7/2020 at 0500  No Yes   Sig: by Both Nostrils route two (2) times a day for 7 days. mupirocin (BACTROBAN) 2 % ointment 10/7/2020 at 0500  Yes Yes   Sig: by Both Nostrils route two (2) times a day. Indications: MSSA   rosuvastatin (CRESTOR) 20 mg tablet 10/6/2020 at 2000  Yes Yes   Sig: Take 20 mg by mouth nightly. Facility-Administered Medications: None      Allergy to:Novocain [procaine]  Physical Examination: General appearance - alert, well appearing, and in no distress  History and physical has been reviewed. The patient has been examined.  There have been no significant clinical changes since the completion of the originally dated History and Physical.    Signed By: Asif Alfonso PA-C     October 7, 2020 7:35 AM

## 2020-09-29 ENCOUNTER — HOSPITAL ENCOUNTER (OUTPATIENT)
Dept: PREADMISSION TESTING | Age: 85
Discharge: HOME | End: 2020-09-29
Payer: MEDICARE

## 2020-09-29 VITALS
DIASTOLIC BLOOD PRESSURE: 70 MMHG | OXYGEN SATURATION: 98 % | TEMPERATURE: 97.6 F | WEIGHT: 155.2 LBS | HEIGHT: 66 IN | BODY MASS INDEX: 24.94 KG/M2 | SYSTOLIC BLOOD PRESSURE: 122 MMHG | RESPIRATION RATE: 18 BRPM

## 2020-09-29 DIAGNOSIS — Z22.321 MSSA (METHICILLIN-SUSCEPTIBLE STAPH AUREUS) CARRIER: Primary | ICD-10-CM

## 2020-09-29 LAB
ABO + RH BLD: NORMAL
ANION GAP SERPL CALC-SCNC: 8 MMOL/L (ref 5–15)
APPEARANCE UR: CLEAR
ATRIAL RATE: 53 BPM
BACTERIA URNS QL MICRO: NEGATIVE /HPF
BILIRUB UR QL: NEGATIVE
BLOOD GROUP ANTIBODIES SERPL: NORMAL
BUN SERPL-MCNC: 22 MG/DL (ref 6–20)
BUN/CREAT SERPL: 21 (ref 12–20)
CALCIUM SERPL-MCNC: 9.3 MG/DL (ref 8.5–10.1)
CALCULATED P AXIS, ECG09: 59 DEGREES
CALCULATED R AXIS, ECG10: -20 DEGREES
CALCULATED T AXIS, ECG11: 6 DEGREES
CHLORIDE SERPL-SCNC: 104 MMOL/L (ref 97–108)
CO2 SERPL-SCNC: 28 MMOL/L (ref 21–32)
COLOR UR: ABNORMAL
CREAT SERPL-MCNC: 1.04 MG/DL (ref 0.55–1.02)
DIAGNOSIS, 93000: NORMAL
EPITH CASTS URNS QL MICRO: ABNORMAL /LPF
ERYTHROCYTE [DISTWIDTH] IN BLOOD BY AUTOMATED COUNT: 16 % (ref 11.5–14.5)
EST. AVERAGE GLUCOSE BLD GHB EST-MCNC: 117 MG/DL
GLUCOSE SERPL-MCNC: 99 MG/DL (ref 65–100)
GLUCOSE UR STRIP.AUTO-MCNC: NEGATIVE MG/DL
HBA1C MFR BLD: 5.7 % (ref 4–5.6)
HCT VFR BLD AUTO: 35.2 % (ref 35–47)
HGB BLD-MCNC: 11.3 G/DL (ref 11.5–16)
HGB UR QL STRIP: NEGATIVE
HYALINE CASTS URNS QL MICRO: ABNORMAL /LPF (ref 0–5)
INR PPP: 1 (ref 0.9–1.1)
KETONES UR QL STRIP.AUTO: NEGATIVE MG/DL
LEUKOCYTE ESTERASE UR QL STRIP.AUTO: ABNORMAL
MCH RBC QN AUTO: 29.7 PG (ref 26–34)
MCHC RBC AUTO-ENTMCNC: 32.1 G/DL (ref 30–36.5)
MCV RBC AUTO: 92.4 FL (ref 80–99)
NITRITE UR QL STRIP.AUTO: NEGATIVE
NRBC # BLD: 0 K/UL (ref 0–0.01)
NRBC BLD-RTO: 0 PER 100 WBC
P-R INTERVAL, ECG05: 142 MS
PH UR STRIP: 5 [PH] (ref 5–8)
PLATELET # BLD AUTO: 204 K/UL (ref 150–400)
PMV BLD AUTO: 10.8 FL (ref 8.9–12.9)
POTASSIUM SERPL-SCNC: 4.3 MMOL/L (ref 3.5–5.1)
PROT UR STRIP-MCNC: NEGATIVE MG/DL
PROTHROMBIN TIME: 10.6 SEC (ref 9–11.1)
Q-T INTERVAL, ECG07: 480 MS
QRS DURATION, ECG06: 122 MS
QTC CALCULATION (BEZET), ECG08: 450 MS
RBC # BLD AUTO: 3.81 M/UL (ref 3.8–5.2)
RBC #/AREA URNS HPF: ABNORMAL /HPF (ref 0–5)
SODIUM SERPL-SCNC: 140 MMOL/L (ref 136–145)
SP GR UR REFRACTOMETRY: 1.01 (ref 1–1.03)
SPECIMEN EXP DATE BLD: NORMAL
UA: UC IF INDICATED,UAUC: ABNORMAL
UROBILINOGEN UR QL STRIP.AUTO: 0.2 EU/DL (ref 0.2–1)
VENTRICULAR RATE, ECG03: 53 BPM
WBC # BLD AUTO: 7.1 K/UL (ref 3.6–11)
WBC URNS QL MICRO: ABNORMAL /HPF (ref 0–4)

## 2020-09-29 PROCEDURE — 86900 BLOOD TYPING SEROLOGIC ABO: CPT

## 2020-09-29 PROCEDURE — 80048 BASIC METABOLIC PNL TOTAL CA: CPT

## 2020-09-29 PROCEDURE — 83036 HEMOGLOBIN GLYCOSYLATED A1C: CPT

## 2020-09-29 PROCEDURE — 81001 URINALYSIS AUTO W/SCOPE: CPT

## 2020-09-29 PROCEDURE — 36415 COLL VENOUS BLD VENIPUNCTURE: CPT

## 2020-09-29 PROCEDURE — 85027 COMPLETE CBC AUTOMATED: CPT

## 2020-09-29 PROCEDURE — 85610 PROTHROMBIN TIME: CPT

## 2020-09-29 PROCEDURE — 93005 ELECTROCARDIOGRAM TRACING: CPT

## 2020-09-29 RX ORDER — CHOLECALCIFEROL (VITAMIN D3) 125 MCG
4000 CAPSULE ORAL DAILY
COMMUNITY

## 2020-09-29 RX ORDER — METFORMIN HYDROCHLORIDE 1000 MG/1
1000 TABLET ORAL 2 TIMES DAILY WITH MEALS
COMMUNITY

## 2020-09-29 RX ORDER — CHROMIUM PICOLINATE 200 MCG
TABLET ORAL
COMMUNITY

## 2020-09-29 RX ORDER — DENOSUMAB 60 MG/ML
60 INJECTION SUBCUTANEOUS
COMMUNITY

## 2020-09-29 NOTE — PERIOP NOTES
PAT Nurse Practitioner   Pre-Operative Chart Review/Assessment:-ORTHOPEDIC                Patient Name:  Abdifatah Oneal                                                           Age:   80 y.o.    :  1933     Today's Date:  2020     Date of PAT:   2020      Date of Surgery:    10/7/2020      Procedure(s):  Right Total Knee Arthroplasty     Surgeon:   Dr. Ness Lyons                       PLAN:      1)  Medical Clearance:  Dr. Wade Boyer      2)  Cardiac Clearance:  Not requested. 3)  Diabetic Treatment Consult:  Not indicated. A1c-5.7      4)  Sleep Apnea evaluation:   Not indicated. MICAH Score 2.       5) Treatment for MRSA/Staph Aureus:  +MSSA. Tx w/ mupirocin      6) Additional Concerns:  Former smoker, METs <4, HTN, GERD, HAs, T2DM, [place]                Vital Signs:         Vitals:    20 1140   BP: 122/70   Resp: 18   Temp: 97.6 °F (36.4 °C)   SpO2: 98%   Weight: 70.4 kg (155 lb 3.3 oz)   Height: 5' 5.5\" (1.664 m)            ____________________________________________  PAST MEDICAL HISTORY  Past Medical History:   Diagnosis Date    Arthritis     Benign colon polyp     Diabetes ([address]     Pre-Diabetes - Diet & Exercise Controlled    Diabetes mellitus     GERD (gastroesophageal reflux disease)     Headache(784.0)     High cholesterol     Hypertension     Neuropathy     Osteopenia       ____________________________________________  PAST SURGICAL HISTORY  Past Surgical History:   Procedure Laterality Date    HX CARPAL TUNNEL RELEASE      HX CATARACT REMOVAL      BILATERAL    HX GI      COLONOSCOPY    HX LUMBAR LAMINECTOMY      HX ORTHOPAEDIC Bilateral 1985    CARPAL ARIEL    HX ROTATOR CUFF REPAIR Left     HX TONSILLECTOMY        ____________________________________________  HOME MEDICATIONS  Current Outpatient Medications   Medication Sig    Calcium-Cholecalciferol, D3, (Calcium 600 with Vitamin D3) 600 mg(1,500mg) -400 unit cap Take  by mouth.     metFORMIN (GLUCOPHAGE) 1,000 mg tablet Take 1,000 mg by mouth two (2) times daily (with meals). AFTER BREAKFAST AND AFTER SUPPER    lactase (LACTAID PO) Take  by mouth daily as needed.  multivit-min/iron/folic/lutein (CENTRUM SILVER WOMEN PO) Take 1 Tab by mouth daily.  cholecalciferol, vitamin D3, (Vitamin D3) 50 mcg (2,000 unit) tab Take 4,000 Units by mouth daily.  loratadine 10 mg cap Take 1 Tab by mouth daily as needed.  denosumab (Prolia) 60 mg/mL injection 60 mg by SubCUTAneous route every 6 months.  gabapentin (NEURONTIN) 300 mg capsule TAKE 2 CAPSULES BY MOUTH IN THE MORNING, AT NOON, AT SUPPER, AND AT BEDTIME (Patient taking differently: BY MOUTH IN THE MORNING, AT NOON, AT SUPPER, AND AT BEDTIME)    diclofenac (VOLTAREN) 1 % gel Apply  to affected area four (4) times daily. (Patient taking differently: Apply  to affected area four (4) times daily as needed.)    esomeprazole (NEXIUM) 40 mg capsule Take 40 mg by mouth daily.  rosuvastatin (CRESTOR) 20 mg tablet Take 20 mg by mouth nightly.  Hydrochlorothiazide 12.5 mg tablet Take 12.5 mg by mouth daily.  losartan (COZAAR) 100 mg tablet Take 100 mg by mouth daily. No current facility-administered medications for this encounter.       ____________________________________________  ALLERGIES  Allergies   Allergen Reactions    Novocain [Procaine] Other (comments)     FAINTS      ____________________________________________  SOCIAL HISTORY  Social History     Tobacco Use    Smoking status: Former Smoker     Packs/day: 1.50     Years: 40.00     Pack years: 60.00     Last attempt to quit: 2000     Years since quittin.2    Smokeless tobacco: Never Used   Substance Use Topics    Alcohol use:  Yes     Alcohol/week: 0.8 standard drinks     Types: 1 Glasses of wine per week      ____________________________________________        Labs:     Hospital Outpatient Visit on 2020   Component Date Value Ref Range Status    Sodium 09/29/2020 140  136 - 145 mmol/L Final    Potassium 09/29/2020 4.3  3.5 - 5.1 mmol/L Final    Chloride 09/29/2020 104  97 - 108 mmol/L Final    CO2 09/29/2020 28  21 - 32 mmol/L Final    Anion gap 09/29/2020 8  5 - 15 mmol/L Final    Glucose 09/29/2020 99  65 - 100 mg/dL Final    BUN 09/29/2020 22* 6 - 20 MG/DL Final    Creatinine 09/29/2020 1.04* 0.55 - 1.02 MG/DL Final    BUN/Creatinine ratio 09/29/2020 21* 12 - 20   Final    GFR est AA 09/29/2020 >60  >60 ml/min/1.73m2 Final    GFR est non-AA 09/29/2020 50* >60 ml/min/1.73m2 Final    Estimated GFR is calculated using the IDMS-traceable Modification of Diet in Renal Disease (MDRD) Study equation, reported for both  Americans (GFRAA) and non- Americans (GFRNA), and normalized to 1.73m2 body surface area. The physician must decide which value applies to the patient.  Calcium 09/29/2020 9.3  8.5 - 10.1 MG/DL Final    WBC 09/29/2020 7.1  3.6 - 11.0 K/uL Final    RBC 09/29/2020 3.81  3.80 - 5.20 M/uL Final    HGB 09/29/2020 11.3* 11.5 - 16.0 g/dL Final    HCT 09/29/2020 35.2  35.0 - 47.0 % Final    MCV 09/29/2020 92.4  80.0 - 99.0 FL Final    MCH 09/29/2020 29.7  26.0 - 34.0 PG Final    MCHC 09/29/2020 32.1  30.0 - 36.5 g/dL Final    RDW 09/29/2020 16.0* 11.5 - 14.5 % Final    PLATELET 14/03/2232 179  150 - 400 K/uL Final    MPV 09/29/2020 10.8  8.9 - 12.9 FL Final    NRBC 09/29/2020 0.0  0  WBC Final    ABSOLUTE NRBC 09/29/2020 0.00  0.00 - 0.01 K/uL Final    Crossmatch Expiration 09/29/2020 10/10/2020   Final    ABO/Rh(D) 09/29/2020 O NEGATIVE   Final    Antibody screen 09/29/2020 NEG   Final    INR 09/29/2020 1.0  0.9 - 1.1   Final    A single therapeutic range for Vit K antagonists may not be optimal for all indications - see June, 2008 issue of Chest, American College of Chest Physicians Evidence-Based Clinical Practice Guidelines, 8th Edition.     Prothrombin time 09/29/2020 10.6  9.0 - 11.1 sec Final    Color 09/29/2020 YELLOW/STRAW    Final    Color Reference Range: Straw, Yellow or Dark Yellow    Appearance 09/29/2020 CLEAR  CLEAR   Final    Specific gravity 09/29/2020 1.015  1.003 - 1.030   Final    pH (UA) 09/29/2020 5.0  5.0 - 8.0   Final    Protein 09/29/2020 Negative  NEG mg/dL Final    Glucose 09/29/2020 Negative  NEG mg/dL Final    Ketone 09/29/2020 Negative  NEG mg/dL Final    Bilirubin 09/29/2020 Negative  NEG   Final    Blood 09/29/2020 Negative  NEG   Final    Urobilinogen 09/29/2020 0.2  0.2 - 1.0 EU/dL Final    Nitrites 09/29/2020 Negative  NEG   Final    Leukocyte Esterase 09/29/2020 TRACE* NEG   Final    UA:UC IF INDICATED 09/29/2020 CULTURE NOT INDICATED BY UA RESULT  CNI   Final    WBC 09/29/2020 0-4  0 - 4 /hpf Final    RBC 09/29/2020 0-5  0 - 5 /hpf Final    Epithelial cells 09/29/2020 FEW  FEW /lpf Final    Epithelial cell category consists of squamous cells and /or transitional urothelial cells. Renal tubular cells, if present, are separately identified as such.     Bacteria 09/29/2020 Negative  NEG /hpf Final    Hyaline cast 09/29/2020 0-2  0 - 5 /lpf Final    Ventricular Rate 09/29/2020 53  BPM Final    Atrial Rate 09/29/2020 53  BPM Final    P-R Interval 09/29/2020 142  ms Final    QRS Duration 09/29/2020 122  ms Final    Q-T Interval 09/29/2020 480  ms Final    QTC Calculation (Bezet) 09/29/2020 450  ms Final    Calculated P Axis 09/29/2020 59  degrees Final    Calculated R Axis 09/29/2020 -20  degrees Final    Calculated T Axis 09/29/2020 6  degrees Final    Diagnosis 09/29/2020    Final                    Value:Sinus bradycardia  Possible Left atrial enlargement  Right bundle branch block  Left ventricular hypertrophy  Abnormal ECG  No previous ECGs available  Confirmed by TODD Hale, [facility] ([id number]) on 9/29/2020 2:47:35 PM      Hemoglobin A1c 09/29/2020 5.7* 4.0 - 5.6 % Final    Comment: NEW METHOD  PLEASE NOTE NEW REFERENCE RANGE  (NOTE)  HbA1C Interpretive Ranges  <5.7              Normal  5.7 - 6.4         Consider Prediabetes  >6.5              Consider Diabetes      Est. average glucose 09/29/2020 117  mg/dL Final    Special Requests: 09/29/2020 NO SPECIAL REQUESTS    Preliminary    Culture result: 09/29/2020 MRSA NOT PRESENT  AT 16 HOURS    Preliminary       Skin:     Denies open wounds, cuts, sores, rashes or other areas of concern in PAT assessment.           Dayan Decker, NP

## 2020-09-30 ENCOUNTER — TRANSCRIBE ORDER (OUTPATIENT)
Dept: REGISTRATION | Age: 85
End: 2020-09-30

## 2020-09-30 DIAGNOSIS — Z01.812 PRE-PROCEDURE LAB EXAM: Primary | ICD-10-CM

## 2020-09-30 LAB
BACTERIA SPEC CULT: NORMAL
BACTERIA SPEC CULT: NORMAL
SERVICE CMNT-IMP: NORMAL

## 2020-10-01 RX ORDER — MUPIROCIN 20 MG/G
OINTMENT TOPICAL 2 TIMES DAILY
Qty: 22 G | Refills: 0 | Status: SHIPPED | OUTPATIENT
Start: 2020-10-01 | End: 2020-10-08

## 2020-10-01 RX ORDER — MUPIROCIN 20 MG/G
OINTMENT TOPICAL 2 TIMES DAILY
COMMUNITY
Start: 2020-10-01 | End: 2020-10-08

## 2020-10-01 NOTE — PERIOP NOTES
PC to pt regarding positive nasal cx (MSSA) and need to start Mupirocin ointment BID x 7 days to B nostrils starting today and bathe with CHG soap for 7 days prior to surgery. Pt verbalized understanding of instructions and will start today as recommended. Allergies and pharmacy of choice reviewed. Rx escribed to pt's pharmacy of choice. PTA medlist updated. Surgeon and PCP notified of positive culture and treatment.      Maxwell Palumbo NP

## 2020-10-03 ENCOUNTER — HOSPITAL ENCOUNTER (OUTPATIENT)
Dept: PREADMISSION TESTING | Age: 85
Discharge: HOME | End: 2020-10-03
Payer: MEDICARE

## 2020-10-03 DIAGNOSIS — Z01.812 PRE-PROCEDURE LAB EXAM: ICD-10-CM

## 2020-10-03 PROCEDURE — 87635 SARS-COV-2 COVID-19 AMP PRB: CPT

## 2020-10-04 LAB — SARS-COV-2, COV2NT: NOT DETECTED

## 2020-10-05 NOTE — PERIOP NOTES
RECEIVED A CALL FROM DR Santo Odom CARDIOLOGIST'S OFFICE THAT THEY HAVE NEVER SEEN PT AT THEIR OFFICE.

## 2020-10-06 ENCOUNTER — ANESTHESIA EVENT (OUTPATIENT)
Dept: SURGERY | Age: 85
DRG: 470 | End: 2020-10-06
Payer: MEDICARE

## 2020-10-07 ENCOUNTER — ANESTHESIA (OUTPATIENT)
Dept: SURGERY | Age: 85
DRG: 470 | End: 2020-10-07
Payer: MEDICARE

## 2020-10-07 ENCOUNTER — HOSPITAL ENCOUNTER (INPATIENT)
Age: 85
LOS: 2 days | Discharge: HOME HEALTH | DRG: 470 | End: 2020-10-09
Attending: ORTHOPAEDIC SURGERY | Admitting: ORTHOPAEDIC SURGERY
Payer: MEDICARE

## 2020-10-07 DIAGNOSIS — Z96.659 STATUS POST KNEE REPLACEMENT, UNSPECIFIED LATERALITY: Primary | ICD-10-CM

## 2020-10-07 PROBLEM — M17.11 RIGHT KNEE DJD: Status: ACTIVE | Noted: 2020-10-07

## 2020-10-07 LAB
GLUCOSE BLD STRIP.AUTO-MCNC: 115 MG/DL (ref 65–100)
GLUCOSE BLD STRIP.AUTO-MCNC: 128 MG/DL (ref 65–100)
GLUCOSE BLD STRIP.AUTO-MCNC: 134 MG/DL (ref 65–100)
GLUCOSE BLD STRIP.AUTO-MCNC: 97 MG/DL (ref 65–100)
SERVICE CMNT-IMP: ABNORMAL
SERVICE CMNT-IMP: NORMAL

## 2020-10-07 PROCEDURE — 97161 PT EVAL LOW COMPLEX 20 MIN: CPT

## 2020-10-07 PROCEDURE — 74011000258 HC RX REV CODE- 258: Performed by: PHYSICIAN ASSISTANT

## 2020-10-07 PROCEDURE — 74011250636 HC RX REV CODE- 250/636: Performed by: NURSE ANESTHETIST, CERTIFIED REGISTERED

## 2020-10-07 PROCEDURE — 77030031139 HC SUT VCRL2 J&J -A: Performed by: ORTHOPAEDIC SURGERY

## 2020-10-07 PROCEDURE — 76210000006 HC OR PH I REC 0.5 TO 1 HR: Performed by: ORTHOPAEDIC SURGERY

## 2020-10-07 PROCEDURE — C1776 JOINT DEVICE (IMPLANTABLE): HCPCS | Performed by: ORTHOPAEDIC SURGERY

## 2020-10-07 PROCEDURE — 2709999900 HC NON-CHARGEABLE SUPPLY: Performed by: ORTHOPAEDIC SURGERY

## 2020-10-07 PROCEDURE — 74011250637 HC RX REV CODE- 250/637: Performed by: ANESTHESIOLOGY

## 2020-10-07 PROCEDURE — 77030038269 HC DRN EXT URIN PURWCK BARD -A

## 2020-10-07 PROCEDURE — 82962 GLUCOSE BLOOD TEST: CPT

## 2020-10-07 PROCEDURE — 74011000250 HC RX REV CODE- 250: Performed by: NURSE ANESTHETIST, CERTIFIED REGISTERED

## 2020-10-07 PROCEDURE — 74011000258 HC RX REV CODE- 258: Performed by: NURSE ANESTHETIST, CERTIFIED REGISTERED

## 2020-10-07 PROCEDURE — 77030005513 HC CATH URETH FOL11 MDII -B: Performed by: ORTHOPAEDIC SURGERY

## 2020-10-07 PROCEDURE — 74011250636 HC RX REV CODE- 250/636: Performed by: ANESTHESIOLOGY

## 2020-10-07 PROCEDURE — 77030018836 HC SOL IRR NACL ICUM -A: Performed by: ORTHOPAEDIC SURGERY

## 2020-10-07 PROCEDURE — 77030018673: Performed by: ORTHOPAEDIC SURGERY

## 2020-10-07 PROCEDURE — 77030012935 HC DRSG AQUACEL BMS -B: Performed by: ORTHOPAEDIC SURGERY

## 2020-10-07 PROCEDURE — 76010000171 HC OR TIME 2 TO 2.5 HR INTENSV-TIER 1: Performed by: ORTHOPAEDIC SURGERY

## 2020-10-07 PROCEDURE — 74011250637 HC RX REV CODE- 250/637: Performed by: PHYSICIAN ASSISTANT

## 2020-10-07 PROCEDURE — 77030035236 HC SUT PDS STRATFX BARB J&J -B: Performed by: ORTHOPAEDIC SURGERY

## 2020-10-07 PROCEDURE — 77030007866 HC KT SPN ANES BBMI -B: Performed by: ANESTHESIOLOGY

## 2020-10-07 PROCEDURE — C1713 ANCHOR/SCREW BN/BN,TIS/BN: HCPCS | Performed by: ORTHOPAEDIC SURGERY

## 2020-10-07 PROCEDURE — 74011250636 HC RX REV CODE- 250/636: Performed by: PHYSICIAN ASSISTANT

## 2020-10-07 PROCEDURE — C9290 INJ, BUPIVACAINE LIPOSOME: HCPCS | Performed by: PHYSICIAN ASSISTANT

## 2020-10-07 PROCEDURE — 76060000035 HC ANESTHESIA 2 TO 2.5 HR: Performed by: ORTHOPAEDIC SURGERY

## 2020-10-07 PROCEDURE — 77030040361 HC SLV COMPR DVT MDII -B

## 2020-10-07 PROCEDURE — 77030006835 HC BLD SAW SAG STRY -B: Performed by: ORTHOPAEDIC SURGERY

## 2020-10-07 PROCEDURE — 77030039497 HC CST PAD STERILE CHCS -A: Performed by: ORTHOPAEDIC SURGERY

## 2020-10-07 PROCEDURE — 0SRC0J9 REPLACEMENT OF RIGHT KNEE JOINT WITH SYNTHETIC SUBSTITUTE, CEMENTED, OPEN APPROACH: ICD-10-PCS | Performed by: ORTHOPAEDIC SURGERY

## 2020-10-07 PROCEDURE — 74011000250 HC RX REV CODE- 250: Performed by: PHYSICIAN ASSISTANT

## 2020-10-07 PROCEDURE — 65270000029 HC RM PRIVATE

## 2020-10-07 PROCEDURE — 77030040361 HC SLV COMPR DVT MDII -B: Performed by: ORTHOPAEDIC SURGERY

## 2020-10-07 PROCEDURE — 77030014077 HC TOWER MX CEM J&J -C: Performed by: ORTHOPAEDIC SURGERY

## 2020-10-07 PROCEDURE — 77030008462 HC STPLR SKN PROX J&J -A: Performed by: ORTHOPAEDIC SURGERY

## 2020-10-07 PROCEDURE — 97116 GAIT TRAINING THERAPY: CPT

## 2020-10-07 DEVICE — ATTUNE KNEE SYSTEM TIBIAL BASE FIXED BEARING SIZE 5 CEMENTED
Type: IMPLANTABLE DEVICE | Site: KNEE | Status: FUNCTIONAL
Brand: ATTUNE

## 2020-10-07 DEVICE — KNEE K1 TOT HEMI STD CEM IMPL CAPPED SYNTHES: Type: IMPLANTABLE DEVICE | Status: FUNCTIONAL

## 2020-10-07 DEVICE — ATTUNE KNEE SYSTEM FEMORAL POSTERIOR STABILIZED NARROW SIZE 6N RIGHT CEMENTED
Type: IMPLANTABLE DEVICE | Site: KNEE | Status: FUNCTIONAL
Brand: ATTUNE

## 2020-10-07 DEVICE — ATTUNE KNEE SYSTEM TIBIAL INSERT FIXED BEARING POSTERIOR STABILIZED 6 6MM AOX
Type: IMPLANTABLE DEVICE | Site: KNEE | Status: FUNCTIONAL
Brand: ATTUNE

## 2020-10-07 DEVICE — ATTUNE PATELLA MEDIALIZED DOME 38MM CEMENTED AOX
Type: IMPLANTABLE DEVICE | Site: KNEE | Status: FUNCTIONAL
Brand: ATTUNE

## 2020-10-07 RX ORDER — PROPOFOL 10 MG/ML
INJECTION, EMULSION INTRAVENOUS AS NEEDED
Status: DISCONTINUED | OUTPATIENT
Start: 2020-10-07 | End: 2020-10-07 | Stop reason: HOSPADM

## 2020-10-07 RX ORDER — SODIUM CHLORIDE 0.9 % (FLUSH) 0.9 %
5-40 SYRINGE (ML) INJECTION EVERY 8 HOURS
Status: DISCONTINUED | OUTPATIENT
Start: 2020-10-07 | End: 2020-10-07 | Stop reason: HOSPADM

## 2020-10-07 RX ORDER — MIDAZOLAM HYDROCHLORIDE 1 MG/ML
1 INJECTION, SOLUTION INTRAMUSCULAR; INTRAVENOUS AS NEEDED
Status: DISCONTINUED | OUTPATIENT
Start: 2020-10-07 | End: 2020-10-07 | Stop reason: HOSPADM

## 2020-10-07 RX ORDER — HYDROMORPHONE HYDROCHLORIDE 1 MG/ML
0.5 INJECTION, SOLUTION INTRAMUSCULAR; INTRAVENOUS; SUBCUTANEOUS
Status: ACTIVE | OUTPATIENT
Start: 2020-10-07 | End: 2020-10-08

## 2020-10-07 RX ORDER — AMOXICILLIN 250 MG
1 CAPSULE ORAL 2 TIMES DAILY
Status: DISCONTINUED | OUTPATIENT
Start: 2020-10-07 | End: 2020-10-09 | Stop reason: HOSPADM

## 2020-10-07 RX ORDER — SODIUM CHLORIDE, SODIUM LACTATE, POTASSIUM CHLORIDE, CALCIUM CHLORIDE 600; 310; 30; 20 MG/100ML; MG/100ML; MG/100ML; MG/100ML
INJECTION, SOLUTION INTRAVENOUS
Status: DISCONTINUED | OUTPATIENT
Start: 2020-10-07 | End: 2020-10-07 | Stop reason: HOSPADM

## 2020-10-07 RX ORDER — CELECOXIB 200 MG/1
200 CAPSULE ORAL 2 TIMES DAILY
Status: DISCONTINUED | OUTPATIENT
Start: 2020-10-07 | End: 2020-10-09 | Stop reason: HOSPADM

## 2020-10-07 RX ORDER — ACETAMINOPHEN 325 MG/1
650 TABLET ORAL ONCE
Status: COMPLETED | OUTPATIENT
Start: 2020-10-07 | End: 2020-10-07

## 2020-10-07 RX ORDER — FENTANYL CITRATE 50 UG/ML
25 INJECTION, SOLUTION INTRAMUSCULAR; INTRAVENOUS
Status: COMPLETED | OUTPATIENT
Start: 2020-10-07 | End: 2020-10-07

## 2020-10-07 RX ORDER — SODIUM CHLORIDE 0.9 % (FLUSH) 0.9 %
5-40 SYRINGE (ML) INJECTION AS NEEDED
Status: DISCONTINUED | OUTPATIENT
Start: 2020-10-07 | End: 2020-10-07 | Stop reason: HOSPADM

## 2020-10-07 RX ORDER — INSULIN LISPRO 100 [IU]/ML
INJECTION, SOLUTION INTRAVENOUS; SUBCUTANEOUS
Status: DISCONTINUED | OUTPATIENT
Start: 2020-10-07 | End: 2020-10-09 | Stop reason: HOSPADM

## 2020-10-07 RX ORDER — SODIUM CHLORIDE, SODIUM LACTATE, POTASSIUM CHLORIDE, CALCIUM CHLORIDE 600; 310; 30; 20 MG/100ML; MG/100ML; MG/100ML; MG/100ML
125 INJECTION, SOLUTION INTRAVENOUS CONTINUOUS
Status: DISCONTINUED | OUTPATIENT
Start: 2020-10-07 | End: 2020-10-07 | Stop reason: HOSPADM

## 2020-10-07 RX ORDER — ONDANSETRON 2 MG/ML
INJECTION INTRAMUSCULAR; INTRAVENOUS AS NEEDED
Status: DISCONTINUED | OUTPATIENT
Start: 2020-10-07 | End: 2020-10-07 | Stop reason: HOSPADM

## 2020-10-07 RX ORDER — LIDOCAINE HYDROCHLORIDE 10 MG/ML
0.5 INJECTION, SOLUTION EPIDURAL; INFILTRATION; INTRACAUDAL; PERINEURAL AS NEEDED
Status: DISCONTINUED | OUTPATIENT
Start: 2020-10-07 | End: 2020-10-07 | Stop reason: HOSPADM

## 2020-10-07 RX ORDER — FAMOTIDINE 20 MG/1
20 TABLET, FILM COATED ORAL DAILY PRN
Status: DISCONTINUED | OUTPATIENT
Start: 2020-10-07 | End: 2020-10-09 | Stop reason: HOSPADM

## 2020-10-07 RX ORDER — PROPOFOL 10 MG/ML
INJECTION, EMULSION INTRAVENOUS
Status: DISCONTINUED | OUTPATIENT
Start: 2020-10-07 | End: 2020-10-07 | Stop reason: HOSPADM

## 2020-10-07 RX ORDER — CELECOXIB 200 MG/1
200 CAPSULE ORAL ONCE
Status: COMPLETED | OUTPATIENT
Start: 2020-10-07 | End: 2020-10-07

## 2020-10-07 RX ORDER — OXYCODONE HYDROCHLORIDE 5 MG/1
5 TABLET ORAL
Status: DISCONTINUED | OUTPATIENT
Start: 2020-10-07 | End: 2020-10-09 | Stop reason: HOSPADM

## 2020-10-07 RX ORDER — METFORMIN HYDROCHLORIDE 500 MG/1
1000 TABLET ORAL
Status: DISCONTINUED | OUTPATIENT
Start: 2020-10-07 | End: 2020-10-07

## 2020-10-07 RX ORDER — PANTOPRAZOLE SODIUM 40 MG/1
40 TABLET, DELAYED RELEASE ORAL
Status: DISCONTINUED | OUTPATIENT
Start: 2020-10-08 | End: 2020-10-09 | Stop reason: HOSPADM

## 2020-10-07 RX ORDER — POLYETHYLENE GLYCOL 3350 17 G/17G
17 POWDER, FOR SOLUTION ORAL DAILY
Status: DISCONTINUED | OUTPATIENT
Start: 2020-10-08 | End: 2020-10-09 | Stop reason: HOSPADM

## 2020-10-07 RX ORDER — HYDROCHLOROTHIAZIDE 25 MG/1
12.5 TABLET ORAL DAILY
Status: DISCONTINUED | OUTPATIENT
Start: 2020-10-08 | End: 2020-10-09 | Stop reason: HOSPADM

## 2020-10-07 RX ORDER — GABAPENTIN 300 MG/1
300 CAPSULE ORAL 3 TIMES DAILY
Status: DISCONTINUED | OUTPATIENT
Start: 2020-10-07 | End: 2020-10-09 | Stop reason: HOSPADM

## 2020-10-07 RX ORDER — NALOXONE HYDROCHLORIDE 0.4 MG/ML
0.4 INJECTION, SOLUTION INTRAMUSCULAR; INTRAVENOUS; SUBCUTANEOUS AS NEEDED
Status: DISCONTINUED | OUTPATIENT
Start: 2020-10-07 | End: 2020-10-09 | Stop reason: HOSPADM

## 2020-10-07 RX ORDER — SODIUM CHLORIDE 0.9 % (FLUSH) 0.9 %
5-40 SYRINGE (ML) INJECTION AS NEEDED
Status: DISCONTINUED | OUTPATIENT
Start: 2020-10-07 | End: 2020-10-09 | Stop reason: HOSPADM

## 2020-10-07 RX ORDER — DIPHENHYDRAMINE HYDROCHLORIDE 50 MG/ML
12.5 INJECTION, SOLUTION INTRAMUSCULAR; INTRAVENOUS AS NEEDED
Status: DISCONTINUED | OUTPATIENT
Start: 2020-10-07 | End: 2020-10-07 | Stop reason: HOSPADM

## 2020-10-07 RX ORDER — ONDANSETRON 2 MG/ML
4 INJECTION INTRAMUSCULAR; INTRAVENOUS
Status: ACTIVE | OUTPATIENT
Start: 2020-10-07 | End: 2020-10-08

## 2020-10-07 RX ORDER — DEXTROSE MONOHYDRATE 100 MG/ML
0-250 INJECTION, SOLUTION INTRAVENOUS AS NEEDED
Status: DISCONTINUED | OUTPATIENT
Start: 2020-10-07 | End: 2020-10-09 | Stop reason: HOSPADM

## 2020-10-07 RX ORDER — FACIAL-BODY WIPES
10 EACH TOPICAL DAILY PRN
Status: DISCONTINUED | OUTPATIENT
Start: 2020-10-09 | End: 2020-10-09 | Stop reason: HOSPADM

## 2020-10-07 RX ORDER — OXYCODONE HYDROCHLORIDE 5 MG/1
5 TABLET ORAL AS NEEDED
Status: DISCONTINUED | OUTPATIENT
Start: 2020-10-07 | End: 2020-10-07 | Stop reason: HOSPADM

## 2020-10-07 RX ORDER — ACETAMINOPHEN 325 MG/1
650 TABLET ORAL EVERY 6 HOURS
Status: DISCONTINUED | OUTPATIENT
Start: 2020-10-07 | End: 2020-10-09 | Stop reason: HOSPADM

## 2020-10-07 RX ORDER — CEFAZOLIN SODIUM/WATER 2 G/20 ML
2 SYRINGE (ML) INTRAVENOUS EVERY 8 HOURS
Status: COMPLETED | OUTPATIENT
Start: 2020-10-07 | End: 2020-10-07

## 2020-10-07 RX ORDER — SODIUM CHLORIDE 9 MG/ML
125 INJECTION, SOLUTION INTRAVENOUS CONTINUOUS
Status: DISPENSED | OUTPATIENT
Start: 2020-10-07 | End: 2020-10-08

## 2020-10-07 RX ORDER — BUPIVACAINE HYDROCHLORIDE 5 MG/ML
INJECTION, SOLUTION EPIDURAL; INTRACAUDAL
Status: COMPLETED | OUTPATIENT
Start: 2020-10-07 | End: 2020-10-07

## 2020-10-07 RX ORDER — ONDANSETRON 4 MG/1
4 TABLET, ORALLY DISINTEGRATING ORAL
Status: DISCONTINUED | OUTPATIENT
Start: 2020-10-07 | End: 2020-10-09 | Stop reason: HOSPADM

## 2020-10-07 RX ORDER — SODIUM CHLORIDE 0.9 % (FLUSH) 0.9 %
5-40 SYRINGE (ML) INJECTION EVERY 8 HOURS
Status: DISCONTINUED | OUTPATIENT
Start: 2020-10-07 | End: 2020-10-07

## 2020-10-07 RX ORDER — FAMOTIDINE 20 MG/1
20 TABLET, FILM COATED ORAL
Status: DISCONTINUED | OUTPATIENT
Start: 2020-10-07 | End: 2020-10-07 | Stop reason: DRUGHIGH

## 2020-10-07 RX ORDER — LOSARTAN POTASSIUM 50 MG/1
100 TABLET ORAL DAILY
Status: DISCONTINUED | OUTPATIENT
Start: 2020-10-08 | End: 2020-10-09 | Stop reason: HOSPADM

## 2020-10-07 RX ORDER — DEXTROSE, SODIUM CHLORIDE, SODIUM LACTATE, POTASSIUM CHLORIDE, AND CALCIUM CHLORIDE 5; .6; .31; .03; .02 G/100ML; G/100ML; G/100ML; G/100ML; G/100ML
125 INJECTION, SOLUTION INTRAVENOUS CONTINUOUS
Status: DISCONTINUED | OUTPATIENT
Start: 2020-10-07 | End: 2020-10-07 | Stop reason: HOSPADM

## 2020-10-07 RX ORDER — CEFAZOLIN SODIUM/WATER 2 G/20 ML
2 SYRINGE (ML) INTRAVENOUS EVERY 8 HOURS
Status: DISCONTINUED | OUTPATIENT
Start: 2020-10-07 | End: 2020-10-07 | Stop reason: HOSPADM

## 2020-10-07 RX ORDER — FENTANYL CITRATE 50 UG/ML
50 INJECTION, SOLUTION INTRAMUSCULAR; INTRAVENOUS AS NEEDED
Status: DISCONTINUED | OUTPATIENT
Start: 2020-10-07 | End: 2020-10-07 | Stop reason: HOSPADM

## 2020-10-07 RX ORDER — ONDANSETRON 2 MG/ML
4 INJECTION INTRAMUSCULAR; INTRAVENOUS AS NEEDED
Status: DISCONTINUED | OUTPATIENT
Start: 2020-10-07 | End: 2020-10-07 | Stop reason: HOSPADM

## 2020-10-07 RX ORDER — LORATADINE 10 MG/1
10 TABLET ORAL DAILY
Status: DISCONTINUED | OUTPATIENT
Start: 2020-10-08 | End: 2020-10-09 | Stop reason: HOSPADM

## 2020-10-07 RX ORDER — MORPHINE SULFATE 10 MG/ML
2 INJECTION, SOLUTION INTRAMUSCULAR; INTRAVENOUS
Status: DISCONTINUED | OUTPATIENT
Start: 2020-10-07 | End: 2020-10-07 | Stop reason: HOSPADM

## 2020-10-07 RX ORDER — OXYCODONE HYDROCHLORIDE 5 MG/1
10 TABLET ORAL
Status: DISCONTINUED | OUTPATIENT
Start: 2020-10-07 | End: 2020-10-09 | Stop reason: HOSPADM

## 2020-10-07 RX ORDER — MAGNESIUM SULFATE 100 %
4 CRYSTALS MISCELLANEOUS AS NEEDED
Status: DISCONTINUED | OUTPATIENT
Start: 2020-10-07 | End: 2020-10-09 | Stop reason: HOSPADM

## 2020-10-07 RX ORDER — HYDROXYZINE HYDROCHLORIDE 10 MG/1
10 TABLET, FILM COATED ORAL
Status: DISCONTINUED | OUTPATIENT
Start: 2020-10-07 | End: 2020-10-09 | Stop reason: HOSPADM

## 2020-10-07 RX ORDER — MIDAZOLAM HYDROCHLORIDE 1 MG/ML
1 INJECTION, SOLUTION INTRAMUSCULAR; INTRAVENOUS
Status: DISCONTINUED | OUTPATIENT
Start: 2020-10-07 | End: 2020-10-07 | Stop reason: HOSPADM

## 2020-10-07 RX ORDER — ROSUVASTATIN CALCIUM 10 MG/1
20 TABLET, COATED ORAL
Status: DISCONTINUED | OUTPATIENT
Start: 2020-10-07 | End: 2020-10-09 | Stop reason: HOSPADM

## 2020-10-07 RX ORDER — ASPIRIN 325 MG
325 TABLET, DELAYED RELEASE (ENTERIC COATED) ORAL 2 TIMES DAILY
Status: DISCONTINUED | OUTPATIENT
Start: 2020-10-07 | End: 2020-10-09 | Stop reason: HOSPADM

## 2020-10-07 RX ADMIN — BUPIVACAINE HYDROCHLORIDE 10 MG: 5 INJECTION, SOLUTION EPIDURAL; INTRACAUDAL; PERINEURAL at 07:47

## 2020-10-07 RX ADMIN — Medication 2 G: at 07:40

## 2020-10-07 RX ADMIN — OXYCODONE 5 MG: 5 TABLET ORAL at 19:26

## 2020-10-07 RX ADMIN — FENTANYL CITRATE 25 MCG: 50 INJECTION, SOLUTION INTRAMUSCULAR; INTRAVENOUS at 12:08

## 2020-10-07 RX ADMIN — FENTANYL CITRATE 50 MCG: 50 INJECTION, SOLUTION INTRAMUSCULAR; INTRAVENOUS at 07:21

## 2020-10-07 RX ADMIN — SODIUM CHLORIDE 125 ML/HR: 900 INJECTION, SOLUTION INTRAVENOUS at 10:15

## 2020-10-07 RX ADMIN — PROPOFOL 50 MG: 10 INJECTION, EMULSION INTRAVENOUS at 07:34

## 2020-10-07 RX ADMIN — SODIUM CHLORIDE, POTASSIUM CHLORIDE, SODIUM LACTATE AND CALCIUM CHLORIDE: 600; 310; 30; 20 INJECTION, SOLUTION INTRAVENOUS at 08:20

## 2020-10-07 RX ADMIN — GABAPENTIN 300 MG: 300 CAPSULE ORAL at 23:02

## 2020-10-07 RX ADMIN — CELECOXIB 200 MG: 200 CAPSULE ORAL at 06:43

## 2020-10-07 RX ADMIN — ACETAMINOPHEN 650 MG: 325 TABLET ORAL at 16:01

## 2020-10-07 RX ADMIN — FENTANYL CITRATE 25 MCG: 50 INJECTION, SOLUTION INTRAMUSCULAR; INTRAVENOUS at 12:03

## 2020-10-07 RX ADMIN — CEFAZOLIN SODIUM 2 G: 300 INJECTION, POWDER, LYOPHILIZED, FOR SOLUTION INTRAVENOUS at 16:02

## 2020-10-07 RX ADMIN — MIDAZOLAM 2 MG: 1 INJECTION INTRAMUSCULAR; INTRAVENOUS at 07:21

## 2020-10-07 RX ADMIN — DOCUSATE SODIUM 50MG AND SENNOSIDES 8.6MG 1 TABLET: 8.6; 5 TABLET, FILM COATED ORAL at 18:06

## 2020-10-07 RX ADMIN — TRANEXAMIC ACID 1 G: 100 INJECTION, SOLUTION INTRAVENOUS at 07:50

## 2020-10-07 RX ADMIN — OXYCODONE 5 MG: 5 TABLET ORAL at 23:02

## 2020-10-07 RX ADMIN — SODIUM CHLORIDE, SODIUM LACTATE, POTASSIUM CHLORIDE, AND CALCIUM CHLORIDE 125 ML/HR: 600; 310; 30; 20 INJECTION, SOLUTION INTRAVENOUS at 06:54

## 2020-10-07 RX ADMIN — CELECOXIB 200 MG: 200 CAPSULE ORAL at 18:07

## 2020-10-07 RX ADMIN — ACETAMINOPHEN 650 MG: 325 TABLET ORAL at 19:26

## 2020-10-07 RX ADMIN — SODIUM CHLORIDE, POTASSIUM CHLORIDE, SODIUM LACTATE AND CALCIUM CHLORIDE: 600; 310; 30; 20 INJECTION, SOLUTION INTRAVENOUS at 07:24

## 2020-10-07 RX ADMIN — FENTANYL CITRATE 25 MCG: 50 INJECTION, SOLUTION INTRAMUSCULAR; INTRAVENOUS at 11:58

## 2020-10-07 RX ADMIN — ONDANSETRON HYDROCHLORIDE 4 MG: 2 INJECTION, SOLUTION INTRAMUSCULAR; INTRAVENOUS at 09:39

## 2020-10-07 RX ADMIN — ACETAMINOPHEN 650 MG: 325 TABLET ORAL at 06:43

## 2020-10-07 RX ADMIN — ASPIRIN 325 MG: 325 TABLET, DELAYED RELEASE ORAL at 18:07

## 2020-10-07 RX ADMIN — FENTANYL CITRATE 25 MCG: 50 INJECTION, SOLUTION INTRAMUSCULAR; INTRAVENOUS at 12:54

## 2020-10-07 RX ADMIN — PROPOFOL 25 MCG/KG/MIN: 10 INJECTION, EMULSION INTRAVENOUS at 07:50

## 2020-10-07 RX ADMIN — OXYCODONE 10 MG: 5 TABLET ORAL at 14:38

## 2020-10-07 RX ADMIN — MORPHINE SULFATE 4 MG: 10 INJECTION INTRAVENOUS at 13:00

## 2020-10-07 RX ADMIN — ROSUVASTATIN 20 MG: 10 TABLET, FILM COATED ORAL at 23:02

## 2020-10-07 RX ADMIN — GABAPENTIN 300 MG: 300 CAPSULE ORAL at 16:01

## 2020-10-07 RX ADMIN — CEFAZOLIN SODIUM 2 G: 300 INJECTION, POWDER, LYOPHILIZED, FOR SOLUTION INTRAVENOUS at 23:02

## 2020-10-07 NOTE — PERIOP NOTES
TRANSFER - OUT REPORT:    Verbal report given to Jostin Church (name) on Grace Perez  being transferred to [phone] (unit) for routine post - op       Report consisted of patients Situation, Background, Assessment and   Recommendations(SBAR). Time Pre op antibiotic given:0740  Anesthesia Stop time: 8952  Mcconnell Present on Transfer to floor:n  Order for Mcconnell on Chart:n  Discharge Prescriptions with Chart:n    Information from the following report(s) SBAR, OR Summary, Intake/Output, MAR, Accordion and Recent Results was reviewed with the receiving nurse. Opportunity for questions and clarification was provided. Is the patient on 02? YES       L/Min 2       Other     Is the patient on a monitor? NO    Is the nurse transporting with the patient? NO    Surgical Waiting Area notified of patient's transfer from PACU? YES      The following personal items collected during your admission accompanied patient upon transfer:   Dental Appliance: Dental Appliances: None  Vision: Visual Aid: Glasses  Hearing Aid:    Jewelry: Jewelry: None  Clothing: Clothing: Other (comment)(CLOTHING & SHOES TO PACU)  Other Valuables:  Other Valuables: None  Valuables sent to safe:      Clothes and glasses to floor with pt

## 2020-10-07 NOTE — PROGRESS NOTES
Primary Nurse Ubaldo Barboza RN and Jennifer Cota RN performed a dual skin assessment on this patient No impairment noted  José Miguel score is

## 2020-10-07 NOTE — OP NOTES
[address]  OPERATIVE REPORT    Name:  Kanu Winn  MR#:  [id number]  :  1933  ACCOUNT #:  [de-identified]  DATE OF SERVICE:  10/07/2020      PREOPERATIVE DIAGNOSIS:  Advanced degenerative arthritis of right knee. POSTOPERATIVE DIAGNOSIS:  Advanced degenerative arthritis of right knee. PROCEDURE PERFORMED:  Right total knee replacement. SURGEON:  Ryder Plata MD    ASSISTANT:  Mare Muñoz PA-C    ANESTHESIA:  Regional block plus spinal.    COMPLICATIONS:  None. SPECIMENS REMOVED:  Tibial and femoral bone fragments. IMPLANTS:  Right total knee components as listed in the operative report. ESTIMATED BLOOD LOSS:  200 mL. DRAINS:  None. INDICATIONS:  The patient has a long history of pain and degenerative arthritis of the right knee. She has failed conservative treatment and now presents for the above procedure. PROCEDURE:  On the day of operation, the patient was taken to the holding area and had regional block administered. The patient was taken to the operating room and had spinal anesthesia administered. The patient was placed in supine position. The consent was confirmed. Antibiotics were given. Right lower extremity was prepped and draped in the usual fashion. After exsanguination with an Esmarch, tourniquet inflated to 275 mmHg. A midline longitudinal incision was made over the knee and was carried through subcutaneous tissue. A medial parapatellar capsular incision was made. Advanced degenerative changes were noted throughout the knee. There was marked degenerative changes of all three compartments especially at the patellofemoral joint. The knee was debrided of osteophytes and soft tissue. A drill was used to gain access to the femoral canal.  Distal femoral cutting guide was assembled with a 5-degree valgus cut. Distal femoral cut made with an oscillating saw. The femur was sized and felt to be #6 in the Attune system. Anterior and posterior cuts were made. Chamfer cuts were made. Box cut for the posterior stabilized prosthesis made. The external tibial alignment guide was assembled. Tibial plateau cut was  made with an oscillating saw. Flexion and extension gaps were evaluated and felt to be appropriate. The tibia was sized and felt to be a #5 in the Attune system. Drill and punch were used to repair the tibial plateau. The patella was prepared with an oscillating saw and sized for 38 mm. Trial components were put into place. The 6-mm insert gave the best fit, range of motion, with proper alignment and proper tracking of the patella. The ligaments were felt to be properly balanced. The trial components were then removed. The knee was sterilely irrigated with pulse irrigation as well as antibiotic solution. The components were cemented into place with antibiotic impregnated cement. The 6-mm insert was put into place. Soft tissues infiltrated with Exparel local anesthetic. After the cement matured, the 6-mm insert was put into place and felt to be stable. Tourniquet released. Coagulation achieved with electrocautery. The capsule repaired with a combination of #2 and #1 Vicryl, supplemented with #2 PDS. 2-0 Vicryl was used to close the subcutaneous tissue. Staples used to close the skin. Sterile dressings were applied. The patient was taken to recovery room in satisfactory condition. The assistant, Fercho Salazar PA-C, assisted me with positioning, retraction, implant installation, and incision closure.         Zoila Douglas MD [email or web address]  D:  10/07/2020 10:29  T:  10/07/2020 12:20  JOB #:  [id number]  CC:  MD Sarah Florian MD

## 2020-10-07 NOTE — ANESTHESIA POSTPROCEDURE EVALUATION
Procedure(s):  RIGHT TOTAL KNEE REPLACEMENT.    spinal    Anesthesia Post Evaluation        Patient location during evaluation: PACU  Patient participation: complete - patient participated  Level of consciousness: awake and alert  Pain management: adequate  Airway patency: patent  Anesthetic complications: no  Cardiovascular status: acceptable  Respiratory status: acceptable  Hydration status: acceptable  Comments: I have seen and evaluated the patient and is ready for discharge. Kirsten Woodall MD    Post anesthesia nausea and vomiting:  none      INITIAL Post-op Vital signs:   Vitals Value Taken Time   /57 10/7/2020 10:45 AM   Temp 36.5 °C (97.7 °F) 10/7/2020 10:45 AM   Pulse 45 10/7/2020 10:57 AM   Resp 11 10/7/2020 10:57 AM   SpO2 100 % 10/7/2020 10:57 AM   Vitals shown include unvalidated device data.

## 2020-10-07 NOTE — BRIEF OP NOTE
Brief Postoperative Note    Patient: Aidee Tineo  YOB: 1933  MRN: [id number]    Date of Procedure: 10/7/2020     Pre-Op Diagnosis: DJD RIGHT KNEE    Post-Op Diagnosis: Same as preoperative diagnosis. Procedure(s):  RIGHT TOTAL KNEE REPLACEMENT    Surgeon(s):  Gabriel Murguia MD    Surgical Assistant: Physician Assistant: Alexandra Betancourt PA-C    Anesthesia: Spinal     Estimated Blood Loss (mL): 606     Complications: None    Specimens: * No specimens in log *     Implants:   Implant Name Type Inv.  Item Serial No.  Lot No. LRB No. Used Action   Smartset GHV Gentamicin Bone Cement 40g   NA [employer] [id number] Right 2 Implanted   COMPONENT PAT [id number] POLYETH DOME MAHESH MEDIALIZED ATTUNE - SNA  COMPONENT PAT [id number] POLYETH DOME MAHESH MEDIALIZED ATTUNE NA [employer]_ [id number] Right 1 Implanted   COMPONENT FEM SZ 6 R KNEE BIENVENIDO POST STBL MAHESH ATTUNE - SNA  COMPONENT FEM SZ 6 R KNEE BIENVENIDO POST STBL MAHESH ATTUNE NA [facility] [employer]_ [id number] Right 1 Implanted   BASEPLATE TIB SZ 5 FIX BEAR MAHESH S+ TECHNOLOGY ATTUNE - SNA  BASEPLATE TIB SZ 5 FIX BEAR MAHESH S+ TECHNOLOGY ATTUNE NA [employer]_WD [id number] Right 1 Implanted   INSERT TIB SZ 6 THK6MM KNEE CRUCE RET FIX BEAR ATTUNE - SNA  INSERT TIB SZ 6 THK6MM KNEE CRUCE RET FIX BEAR ATTUNE NA [employer]_ [id number] Right 1 Implanted       Drains: * No LDAs found *    Findings: DJD Right knee    Electronically Signed by Jonathon Barros PA-C on 10/7/2020 at 9:48 AM

## 2020-10-07 NOTE — ANESTHESIA PREPROCEDURE EVALUATION
Anesthetic History   No history of anesthetic complications            Review of Systems / Medical History  Patient summary reviewed, nursing notes reviewed and pertinent labs reviewed    Pulmonary  Within defined limits                 Neuro/Psych   Within defined limits           Cardiovascular  Within defined limits  Hypertension                   GI/Hepatic/Renal  Within defined limits   GERD           Endo/Other  Within defined limits  Diabetes         Other Findings   Comments: prediabetic           Physical Exam    Airway  Mallampati: II  TM Distance: > 6 cm  Neck ROM: normal range of motion   Mouth opening: Normal     Cardiovascular  Regular rate and rhythm,  S1 and S2 normal,  no murmur, click, rub, or gallop             Dental    Dentition: Caps/crowns     Pulmonary  Breath sounds clear to auscultation               Abdominal  GI exam deferred       Other Findings            Anesthetic Plan    ASA: 2  Anesthesia type: spinal          Induction: Intravenous  Anesthetic plan and risks discussed with: Patient

## 2020-10-07 NOTE — PERIOP NOTES
Attempted to call friend at the start of the procedure to inform her of the surgery start time and patient's well-being, but it went to voicemail. No message was left.

## 2020-10-07 NOTE — PROGRESS NOTES
Problem: Mobility Impaired (Adult and Pediatric)  Goal: *Acute Goals and Plan of Care (Insert Text)  Description: FUNCTIONAL STATUS PRIOR TO ADMISSION: Patient was independent and active without use of DME.    HOME SUPPORT PRIOR TO ADMISSION: The patient lived with friend but did not require assist.    Physical Therapy Goals  Initiated 10/7/2020    1. Patient will move from supine to sit and sit to supine  and roll side to side in bed with modified independence within 4 days. 2. Patient will perform sit to stand with modified independence within 4 days. 3. Patient will ambulate with modified independence for 150 feet with the least restrictive device within 4 days. 4. Patient will ascend/descend 4 stairs with 1 handrail(s) with modified independence within 4 days. 5. Patient will perform home exercise program per protocol with modified independence within 4 days. 6. Patient will demonstrate AROM 0-90 degrees in operative joint within 4 days. Outcome: Progressing Towards Goal   PHYSICAL THERAPY EVALUATION  Patient: Clementina Berg (46 y.o. female)  Date: 10/7/2020  Primary Diagnosis: Right knee DJD [M17.11]  Procedure(s) (LRB):  RIGHT TOTAL KNEE REPLACEMENT (Right) Day of Surgery   Precautions:   Fall, WBAT      ASSESSMENT  Based on the objective data described below, the patient presents with decreased mobility after R TKA, POD0. She was able to ambulate in the room with the RW with VSS and min assist overall. She was very reluctant to flex her knee and had difficulty achieving full extension despite pain medication. She will have assist from her friend at home and will only need to manage a few entry steps. She has a walker at home already. Expect that she will progress well with her mobility once her pain is better under control. Reviewed safety considerations and plan of care with patient and caregiver.     Current Level of Function Impacting Discharge (mobility/balance): min to mod assist for short distance ambulation with RW    Functional Outcome Measure: The patient scored Total: 55/100 on the Barthel Index which is indicative of moderately impaired ability to care for basic self needs/dependency on others. Other factors to consider for discharge: pain control     Patient will benefit from skilled therapy intervention to address the above noted impairments. PLAN :  Recommendations and Planned Interventions: bed mobility training, transfer training, gait training, therapeutic exercises, patient and family training/education, and therapeutic activities      Frequency/Duration: Patient will be followed by physical therapy:  twice daily to address goals.     Recommendation for discharge: (in order for the patient to meet his/her long term goals)  Physical therapy at least 2 days/week in the home AND ensure assist and/or supervision for safety with mobility    This discharge recommendation:  Has been made in collaboration with the attending provider and/or case management    IF patient discharges home will need the following DME: patient owns DME required for discharge         SUBJECTIVE:   Patient stated her pain is elevated, but she is agreeable to getting out of bed     OBJECTIVE DATA SUMMARY:   HISTORY:    Past Medical History:   Diagnosis Date    Arthritis     Benign colon polyp 2020    Diabetes ([facility] [address]     Pre-Diabetes - Diet & Exercise Controlled    Diabetes mellitus     GERD (gastroesophageal reflux disease)     Headache(784.0)     High cholesterol     Hypertension     Neuropathy     Osteopenia      Past Surgical History:   Procedure Laterality Date    HX CARPAL TUNNEL RELEASE      HX CATARACT REMOVAL      BILATERAL    HX GI      COLONOSCOPY    HX LUMBAR LAMINECTOMY  2013    HX ORTHOPAEDIC Bilateral 1985    CARPAL [facility]    HX ROTATOR CUFF REPAIR Left     HX TONSILLECTOMY         Personal factors and/or comorbidities impacting plan of care: as noted above plus R TKA EXAMINATION/PRESENTATION/DECISION MAKING:   Critical Behavior:  Neurologic State: Alert           Hearing:     Skin:  postop ace in place with no drainage noted  Edema: none  Range Of Motion:  AROM: Within functional limits(R knee appox -10 to 45 due to pain)                       Strength:    Strength: Within functional limits(RLE 3-/5 post op due to pain)                    Tone & Sensation:   Tone: Normal              Sensation: Intact               Coordination:  Coordination: Within functional limits  Vision:      Functional Mobility:  Bed Mobility:     Supine to Sit: Additional time;Bed Modified; Moderate assistance(HOB elevated)  Sit to Supine: Moderate assistance; Additional time  Scooting: Minimum assistance  Transfers:  Sit to Stand: Minimum assistance; Adaptive equipment; Additional time  Stand to Sit: Minimum assistance; Adaptive equipment; Additional time        Bed to Chair: Moderate assistance; Adaptive equipment; Additional time              Balance:   Sitting: Intact; Without support  Standing: With support; Impaired(hands on the walker)  Standing - Static: Fair;Constant support  Standing - Dynamic : Fair;Constant support  Ambulation/Gait Training:  Distance (ft): 25 Feet (ft)  Assistive Device: Gait belt;Walker, rolling  Ambulation - Level of Assistance: Minimal assistance; Adaptive equipment; Additional time        Gait Abnormalities: Antalgic;Decreased step clearance; Step to gait  Right Side Weight Bearing: As tolerated  Left Side Weight Bearing: Full  Base of Support: Widened;Shift to left  Stance: Right decreased  Speed/Corrie: Slow  Step Length: Right shortened;Left shortened  Swing Pattern: Right asymmetrical     Interventions: Safety awareness training; Tactile cues; Verbal cues; Visual/Demos            Stairs: Therapeutic Exercises:    Ankle pumps and heel slides and quad sets    Functional Measure:  Barthel Index:    Bathin  Bladder: 10  Bowels: 10  Groomin  Dressin  Feeding: 10  Mobility: 0  Stairs: 0  Toilet Use: 5  Transfer (Bed to Chair and Back): 10  Total: 55/100       The Barthel ADL Index: Guidelines  1. The index should be used as a record of what a patient does, not as a record of what a patient could do. 2. The main aim is to establish degree of independence from any help, physical or verbal, however minor and for whatever reason. 3. The need for supervision renders the patient not independent. 4. A patient's performance should be established using the best available evidence. Asking the patient, friends/relatives and nurses are the usual sources, but direct observation and common sense are also important. However direct testing is not needed. 5. Usually the patient's performance over the preceding 24-48 hours is important, but occasionally longer periods will be relevant. 6. Middle categories imply that the patient supplies over 50 per cent of the effort. 7. Use of aids to be independent is allowed. Hitesh Robles, Barthel, D.W. (7042). Functional evaluation: the Barthel Index. [facility] (14)2. JASON Chu, Harprete Mckee., Greta [facility], [address] [place] [address] (1999). Measuring the change indisability after inpatient rehabilitation; comparison of the responsiveness of the Barthel Index and Functional [place] Measure. Journal of Neurology, Neurosurgery, and Psychiatry, 66(4), 960-673. KRISTOPHER Downing, TR Parekh, & Kiera Pennington M.A. (2004.) Assessment of post-stroke quality of life in cost-effectiveness studies: The usefulness of the Barthel Index and the EuroQoL-5D.  Quality of Life Research, 15, 102-08        Physical Therapy Evaluation Charge Determination   History Examination Presentation Decision-Making   HIGH Complexity :3+ comorbidities / personal factors will impact the outcome/ POC  MEDIUM Complexity : 3 Standardized tests and measures addressing body structure, function, activity limitation and / or participation in recreation MEDIUM Complexity : Evolving with changing characteristics  LOW Complexity : FOTO score of       Based on the above components, the patient evaluation is determined to be of the following complexity level: LOW     Pain Ratin/10 and RN is aware, pain meds given and ice applied to R knee and RLE elevated for comfort    Activity Tolerance:   Good  Please refer to the flowsheet for vital signs taken during this treatment. After treatment patient left in no apparent distress:   Supine in bed, Call bell within reach, Caregiver / family present, Side rails x 3, and ice in place R knee    COMMUNICATION/EDUCATION:   The patients plan of care was discussed with: Registered nurse. Fall prevention education was provided and the patient/caregiver indicated understanding., Patient/family have participated as able in goal setting and plan of care. , and Patient/family agree to work toward stated goals and plan of care.     Thank you for this referral.  Jared Jones, PT   Time Calculation: 28 mins

## 2020-10-07 NOTE — PROGRESS NOTES
Patient did not complete online pre-op education as she stated \"I was going to, but my computer \"    Patient did receive the joint book/binder; patient stated that she read the entire book and so did her friend who will be her \"\" at home after discharge.

## 2020-10-07 NOTE — PROGRESS NOTES
Problem: Knee Replacement: Day of Surgery/Unit  Goal: Activity/Safety  Outcome: Progressing Towards Goal  Note: Up to bedside commode with minimal assist. Did initial physical therapy.  Instructed to call for assistance, call bell in reach and zone phone number provided  Goal: Diagnostic Test/Procedures  Outcome: Progressing Towards Goal  Goal: Nutrition/Diet  Outcome: Progressing Towards Goal  Note: Regular diet, tolerated well  Goal: Medications  Outcome: Progressing Towards Goal  Note: Informed on new medication  Goal: Respiratory  Outcome: Progressing Towards Goal  Note: Instructed on use of incentive spirometer, encouraged to use 10x/hr while awake  Goal: Psychosocial  Outcome: Progressing Towards Goal  Goal: *Optimal pain control at patient's stated goal  Outcome: Progressing Towards Goal     Problem: Diabetes Maintenance:Admission  Goal: Activity/Safety  Outcome: Progressing Towards Goal  Goal: Nutrition  Outcome: Progressing Towards Goal  Goal: Medications  Outcome: Progressing Towards Goal  Note: Blood sugar checks prior to meals and at bedtime

## 2020-10-07 NOTE — PROGRESS NOTES
Ortho Post-Op Note    10/7/2020  2:55 PM    POD:  Day of Surgery  S/P:  Procedure(s):  RIGHT TOTAL KNEE REPLACEMENT    Afebrile/VSS, NAD, A&O x 3  Doing well without complaints of nausea  Pain well controlled  Calves soft/NTTP Bilaterally  leg soft. Dressing clean and dry  Moving lower extremities well. Neurocirculatory exam intact and within normal range. H/O T2DM on metformin- will hold and use SS lispro  Lab Results   Component Value Date/Time    HGB 11.3 (L) 09/29/2020 12:28 PM    INR 1.0 09/29/2020 12:28 PM     Recent Labs     09/29/20  1228   CREA 1.04*   BUN 22*     Estimated Creatinine Clearance: 38 mL/min (A) (by C-G formula based on SCr of 1.04 mg/dL (H)).     PLAN:  DVT prophylaxis:  mg bid  WBAT with PT-mobilization  Pain Control: tylenol, celebrex, oxycodone  Plan to D/C home in 1-2 days      Jose Card, [address]

## 2020-10-07 NOTE — ANESTHESIA PROCEDURE NOTES
Spinal Block    Start time: 10/7/2020 7:35 AM  Performed by: Abdon Rod RN  Authorized by: Abdon Rod RN     Pre-procedure:   Indications: primary anesthetic  Preanesthetic Checklist: patient identified, risks and benefits discussed, anesthesia consent, site marked, patient being monitored and timeout performed      Spinal Block:   Patient Position:  Seated  Prep Region:  Lumbar  Prep: Betadine      Location:  L3-4          Needle:   Needle Type:  Pencil-tip  Needle Gauge:  24 G  Attempts:  1      Events: CSF confirmed, no blood with aspiration and no paresthesia        Assessment:  Insertion:  Uncomplicated  Patient tolerance:  Patient tolerated the procedure well with no immediate complications

## 2020-10-08 LAB
ANION GAP SERPL CALC-SCNC: 6 MMOL/L (ref 5–15)
BUN SERPL-MCNC: 18 MG/DL (ref 6–20)
BUN/CREAT SERPL: 19 (ref 12–20)
CALCIUM SERPL-MCNC: 8.2 MG/DL (ref 8.5–10.1)
CHLORIDE SERPL-SCNC: 110 MMOL/L (ref 97–108)
CO2 SERPL-SCNC: 25 MMOL/L (ref 21–32)
CREAT SERPL-MCNC: 0.96 MG/DL (ref 0.55–1.02)
GLUCOSE BLD STRIP.AUTO-MCNC: 116 MG/DL (ref 65–100)
GLUCOSE BLD STRIP.AUTO-MCNC: 136 MG/DL (ref 65–100)
GLUCOSE BLD STRIP.AUTO-MCNC: 153 MG/DL (ref 65–100)
GLUCOSE BLD STRIP.AUTO-MCNC: 173 MG/DL (ref 65–100)
GLUCOSE SERPL-MCNC: 105 MG/DL (ref 65–100)
HGB BLD-MCNC: 9.5 G/DL (ref 11.5–16)
POTASSIUM SERPL-SCNC: 4.2 MMOL/L (ref 3.5–5.1)
SERVICE CMNT-IMP: ABNORMAL
SODIUM SERPL-SCNC: 141 MMOL/L (ref 136–145)

## 2020-10-08 PROCEDURE — 97530 THERAPEUTIC ACTIVITIES: CPT

## 2020-10-08 PROCEDURE — 65270000029 HC RM PRIVATE

## 2020-10-08 PROCEDURE — 97116 GAIT TRAINING THERAPY: CPT

## 2020-10-08 PROCEDURE — 85018 HEMOGLOBIN: CPT

## 2020-10-08 PROCEDURE — 80048 BASIC METABOLIC PNL TOTAL CA: CPT

## 2020-10-08 PROCEDURE — 74011250637 HC RX REV CODE- 250/637: Performed by: PHYSICIAN ASSISTANT

## 2020-10-08 PROCEDURE — 97165 OT EVAL LOW COMPLEX 30 MIN: CPT

## 2020-10-08 PROCEDURE — 74011636637 HC RX REV CODE- 636/637: Performed by: PHYSICIAN ASSISTANT

## 2020-10-08 PROCEDURE — 36415 COLL VENOUS BLD VENIPUNCTURE: CPT

## 2020-10-08 PROCEDURE — 97110 THERAPEUTIC EXERCISES: CPT

## 2020-10-08 PROCEDURE — 82962 GLUCOSE BLOOD TEST: CPT

## 2020-10-08 RX ORDER — OXYCODONE HYDROCHLORIDE 5 MG/1
5-10 TABLET ORAL
Qty: 60 TAB | Refills: 0 | Status: SHIPPED | OUTPATIENT
Start: 2020-10-08 | End: 2020-10-15

## 2020-10-08 RX ORDER — ASPIRIN 325 MG
325 TABLET ORAL 2 TIMES DAILY
Qty: 60 TAB | Refills: 0 | Status: SHIPPED
Start: 2020-10-08 | End: 2020-11-07

## 2020-10-08 RX ADMIN — OXYCODONE 5 MG: 5 TABLET ORAL at 02:12

## 2020-10-08 RX ADMIN — ASPIRIN 325 MG: 325 TABLET, DELAYED RELEASE ORAL at 17:21

## 2020-10-08 RX ADMIN — OXYCODONE 10 MG: 5 TABLET ORAL at 11:03

## 2020-10-08 RX ADMIN — GABAPENTIN 300 MG: 300 CAPSULE ORAL at 15:15

## 2020-10-08 RX ADMIN — ACETAMINOPHEN 650 MG: 325 TABLET ORAL at 14:10

## 2020-10-08 RX ADMIN — HYDROCHLOROTHIAZIDE 12.5 MG: 25 TABLET ORAL at 08:37

## 2020-10-08 RX ADMIN — DOCUSATE SODIUM 50MG AND SENNOSIDES 8.6MG 1 TABLET: 8.6; 5 TABLET, FILM COATED ORAL at 08:37

## 2020-10-08 RX ADMIN — OXYCODONE 10 MG: 5 TABLET ORAL at 21:47

## 2020-10-08 RX ADMIN — ASPIRIN 325 MG: 325 TABLET, DELAYED RELEASE ORAL at 08:37

## 2020-10-08 RX ADMIN — ACETAMINOPHEN 650 MG: 325 TABLET ORAL at 07:36

## 2020-10-08 RX ADMIN — PANTOPRAZOLE SODIUM 40 MG: 40 TABLET, DELAYED RELEASE ORAL at 17:25

## 2020-10-08 RX ADMIN — OXYCODONE 10 MG: 5 TABLET ORAL at 17:21

## 2020-10-08 RX ADMIN — LORATADINE 10 MG: 10 TABLET ORAL at 08:37

## 2020-10-08 RX ADMIN — GABAPENTIN 300 MG: 300 CAPSULE ORAL at 08:37

## 2020-10-08 RX ADMIN — POLYETHYLENE GLYCOL 3350 17 G: 17 POWDER, FOR SOLUTION ORAL at 08:38

## 2020-10-08 RX ADMIN — GABAPENTIN 300 MG: 300 CAPSULE ORAL at 21:47

## 2020-10-08 RX ADMIN — OXYCODONE 10 MG: 5 TABLET ORAL at 14:10

## 2020-10-08 RX ADMIN — ROSUVASTATIN 20 MG: 10 TABLET, FILM COATED ORAL at 21:47

## 2020-10-08 RX ADMIN — ACETAMINOPHEN 650 MG: 325 TABLET ORAL at 02:12

## 2020-10-08 RX ADMIN — ACETAMINOPHEN 650 MG: 325 TABLET ORAL at 21:47

## 2020-10-08 RX ADMIN — PANTOPRAZOLE SODIUM 40 MG: 40 TABLET, DELAYED RELEASE ORAL at 07:36

## 2020-10-08 RX ADMIN — DOCUSATE SODIUM 50MG AND SENNOSIDES 8.6MG 1 TABLET: 8.6; 5 TABLET, FILM COATED ORAL at 17:21

## 2020-10-08 RX ADMIN — LOSARTAN POTASSIUM 100 MG: 50 TABLET, FILM COATED ORAL at 08:37

## 2020-10-08 RX ADMIN — OXYCODONE 10 MG: 5 TABLET ORAL at 07:36

## 2020-10-08 RX ADMIN — CELECOXIB 200 MG: 200 CAPSULE ORAL at 08:37

## 2020-10-08 RX ADMIN — CELECOXIB 200 MG: 200 CAPSULE ORAL at 17:21

## 2020-10-08 RX ADMIN — OXYCODONE 5 MG: 5 TABLET ORAL at 05:16

## 2020-10-08 RX ADMIN — INSULIN LISPRO 2 UNITS: 100 INJECTION, SOLUTION INTRAVENOUS; SUBCUTANEOUS at 22:26

## 2020-10-08 NOTE — PROGRESS NOTES
Problem: Knee Replacement: Post-Op Day 1  Goal: *Optimal pain control at patient's stated goal  Outcome: Progressing Towards Goal  Goal: *Hemodynamically stable  Outcome: Progressing Towards Goal

## 2020-10-08 NOTE — PROGRESS NOTES
Problem: Mobility Impaired (Adult and Pediatric)  Goal: *Acute Goals and Plan of Care (Insert Text)  Description: FUNCTIONAL STATUS PRIOR TO ADMISSION: Patient was independent and active without use of DME.    HOME SUPPORT PRIOR TO ADMISSION: The patient lived with friend but did not require assist.    Physical Therapy Goals  Initiated 10/7/2020    1. Patient will move from supine to sit and sit to supine  and roll side to side in bed with modified independence within 4 days. 2. Patient will perform sit to stand with modified independence within 4 days. 3. Patient will ambulate with modified independence for 150 feet with the least restrictive device within 4 days. 4. Patient will ascend/descend 4 stairs with 1 handrail(s) with modified independence within 4 days. 5. Patient will perform home exercise program per protocol with modified independence within 4 days. 6. Patient will demonstrate AROM 0-90 degrees in operative joint within 4 days. Outcome: Progressing Towards Goal   PHYSICAL THERAPY MORNING SESSION NOTE  Patient: Noemi Mckeon (89 y.o. 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 She is walking only about 45 feet at min assist level of assistance and with a RW and very slow pace . The primary limiting factors are pain and limited ROM of the R knee. Note that she also has considerable limitation in the R shoulder elevation and internal rotation which was POA. This impacts her bed mobility considerably, requiring mod assist for supine to sit towards the L side of the bed, which she will need to do at home. Removed ace and padding, performed CHG wipe to RLE and applied compression stocking. RN notified. Dressing is intact and has only scant drainage.  She performed ankle pumps, quad sets and heel slides, and ROM is quite limited in R knee. Currently, expect Oralee Flavors will need 2-3 more session(s) to meet the therapy goals in preparation for returning home. The full therapy note will follow after this afternoon's session. Morning session functional mobility:  Bed Mobility:     Supine to Sit: Moderate assistance; Additional time  Sit to Supine: Additional time;Minimum assistance  Scooting: Minimum assistance; Additional time  Transfers:  Sit to Stand: Minimum assistance; Adaptive equipment; Additional time  Stand to Sit: Contact guard assistance; Adaptive equipment; Additional time        Bed to Chair: Minimum assistance; Adaptive equipment; Additional time(very slow pace)              Balance:   Sitting: Intact; Without support  Standing: Impaired; With support(hands on the walker)  Standing - Static: Fair;Occasional  Standing - Dynamic : Fair;Occasional  Ambulation/Gait Training:  Distance (ft): 45 Feet (ft)  Assistive Device: Gait belt;Walker, rolling  Ambulation - Level of Assistance: Minimal assistance; Adaptive equipment; Additional time        Gait Abnormalities: Antalgic;Decreased step clearance; Step to gait(intermittently able to step through)  Right Side Weight Bearing: As tolerated  Left Side Weight Bearing: Full  Base of Support: Widened;Shift to left  Stance: Right decreased  Speed/Corrie: Slow  Step Length: Right shortened;Left shortened  Swing Pattern: Right asymmetrical     Interventions: Safety awareness training; Tactile cues; Verbal cues; Visual/Demos        Stairs:              Thank you for this referral.  Mortimer Fraise, PT   Time Calculation: 41 mins

## 2020-10-08 NOTE — PROGRESS NOTES
Problem: Self Care Deficits Care Plan (Adult)  Goal: *Acute Goals and Plan of Care (Insert Text)  Description: Occupational Therapy Goals  Initiated: 10/8/2020   1. Patient will perform grooming with supervision/set-up standing at sink within 3 day(s). 2.  Patient will perform bathing with supervision/set-up from chair within 3 day(s). 3.  Patient will perform upper body dressing and lower body dressing with supervision/set-up within 3 day(s). 4.  Patient will perform toilet transfers with supervision/set-up within 3 day(s). 5.  Patient will perform all aspects of toileting with supervision/set-up within 3 day(s). FUNCTIONAL STATUS PRIOR TO ADMISSION: Independent with all activities prior to admission. HOME SUPPORT: Friend    Outcome: Progressing Towards Goal    OCCUPATIONAL THERAPY EVALUATION  Patient: Alicia Estrella (12 y.o. 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 Pt is independent at baseline with all activities. 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 Pt's friend now will be caring for her during her recovery. She is moving well overall at min A to CG level to [facility]. Pt eager to dress tomorrow in preparation for discharge home. Recommend home pending continued progress with self care and functional mobility activities. Current Level of Function Impacting Discharge (ADLs/self-care): min A    Functional Outcome Measure: The patient scored 55 on the Barthel Index outcome measure which is indicative of 45% impairment with ADl activities.       Other factors to consider for discharge: pain     Patient will benefit from skilled therapy intervention to address the above noted impairments. PLAN :  Recommendations and Planned Interventions: self care training, functional mobility training, therapeutic exercise, balance training, therapeutic activities, endurance activities, patient education, home safety training, and family training/education    Frequency/Duration: Patient will be followed by occupational therapy 5 times a week to address goals. Recommendation for discharge: (in order for the patient to meet his/her long term goals)  No skilled occupational therapy/ follow up rehabilitation needs identified at this time. This discharge recommendation:  Has been made in collaboration with the attending provider and/or case management    IF patient discharges home will need the following DME: none       SUBJECTIVE:   Patient stated I am really feeling alright, but I am so tired.     OBJECTIVE DATA SUMMARY:   HISTORY:   Past Medical History:   Diagnosis Date    Arthritis     Benign colon polyp 2020    Diabetes ([facility] [address]     Pre-Diabetes - Diet & Exercise Controlled    Diabetes mellitus     GERD (gastroesophageal reflux disease)     Headache(784.0)     High cholesterol     Hypertension     Neuropathy     Osteopenia      Past Surgical History:   Procedure Laterality Date    HX CARPAL TUNNEL RELEASE      HX CATARACT REMOVAL      BILATERAL    HX GI      COLONOSCOPY    HX LUMBAR LAMINECTOMY  2013    HX ORTHOPAEDIC Bilateral 1985    CARPAL ARIEL    HX ROTATOR CUFF REPAIR Left     HX TONSILLECTOMY         Expanded or extensive additional review of patient history:     Home Situation  Home Environment: Private residence  # Steps to Enter: 1  One/Two Story Residence: Two story, live on 1st floor  Living Alone: No  Support Systems: Friends \ neighbors  Patient Expects to be Discharged to[de-identified] Private residence  Current DME Used/Available at Home: Cane, straight, Commode, bedside, Wheelchair, Walker, rolling, Tub transfer bench  Tub or Shower Type: Tub/Shower combination    Hand dominance: Right    EXAMINATION OF PERFORMANCE DEFICITS:  Cognitive/Behavioral Status:  Neurologic State: Alert  Orientation Level: Oriented X4  Cognition: Appropriate for age attention/concentration  Perception: Appears intact  Perseveration: No perseveration noted  Safety/Judgement: Good awareness of safety precautions    Skin: see nursing notes    Edema: none noted    Hearing: Auditory  Auditory Impairment: None    Vision/Perceptual:                           Acuity: Within Defined Limits         Range of Motion:    AROM: Within functional limits  PROM: Within functional limits                      Strength:    Strength: Within functional limits                Coordination:  Coordination: Within functional limits  Fine Motor Skills-Upper: Right Intact; Left Intact    Gross Motor Skills-Upper: Right Intact; Left Intact    Tone & Sensation:    Tone: Normal  Sensation: Intact                      Balance:  Sitting: Intact; Without support  Standing: Impaired; With support  Standing - Static: Fair  Standing - Dynamic : Fair    Functional Mobility and Transfers for ADLs:  Bed Mobility:  Supine to Sit: Additional time; Moderate assistance  Sit to Supine: Additional time; Moderate assistance  Scooting: Minimum assistance; Additional time    Transfers:  Sit to Stand: Minimum assistance  Stand to Sit: Minimum assistance  Bed to Chair: Minimum assistance  Bathroom Mobility: Minimum assistance  Toilet Transfer : Minimum assistance    ADL Assessment:  Feeding: Supervision    Oral Facial Hygiene/Grooming: Supervision    Bathing: Moderate assistance    Upper Body Dressing: Supervision    Lower Body Dressing: Moderate assistance    Toileting: Minimum assistance                ADL Intervention and task modifications:   Transitioned to EOB for dressing activities and BSC transfer. Pt did well with tasks overall with education and training for safe functional mobility using RW. Cognitive Retraining  Safety/Judgement: Good awareness of safety precautions    Functional Measure:  Barthel Index:    Bathin  Bladder: 10  Bowels: 10  Groomin  Dressin  Feeding: 10  Mobility: 0  Stairs: 0  Toilet Use: 5  Transfer (Bed to Chair and Back): 10  Total: 55/100        The Barthel ADL Index: Guidelines  1. The index should be used as a record of what a patient does, not as a record of what a patient could do. 2. The main aim is to establish degree of independence from any help, physical or verbal, however minor and for whatever reason. 3. The need for supervision renders the patient not independent. 4. A patient's performance should be established using the best available evidence. Asking the patient, friends/relatives and nurses are the usual sources, but direct observation and common sense are also important. However direct testing is not needed. 5. Usually the patient's performance over the preceding 24-48 hours is important, but occasionally longer periods will be relevant. 6. Middle categories imply that the patient supplies over 50 per cent of the effort. 7. Use of aids to be independent is allowed. Kendall Herring., Barthel, D.W. (8014). Functional evaluation: the Barthel Index. [facility] (14)2. Corey Jimenez radha Deshaun, MARLONF, Elizabeth Mclain., Aniyah Newell., [place], [address] [place] [address] (). Measuring the change indisability after inpatient rehabilitation; comparison of the responsiveness of the Barthel Index and Functional [place] Measure. Journal of Neurology, Neurosurgery, and Psychiatry, 66(4), 397-452. BRYANT Jett.TAMEKA, TR Parekh, & Antonino Oglesby M.A. (2004.) Assessment of post-stroke quality of life in cost-effectiveness studies: The usefulness of the Barthel Index and the EuroQoL-5D.  Quality of Life Research, 15, 535-57         Occupational Therapy Evaluation Charge Determination   History Examination Decision-Making   LOW Complexity : Brief history review HIGH Complexity : 5 or more performance deficits relating to physical, cognitive , or psychosocial skils that result in activity limitations and / or participation restrictions HIGH Complexity : Patient presents with comorbidities that affect occupational performance. Signifigant modification of tasks or assistance (eg, physical or verbal) with assessment (s) is necessary to enable patient to complete evaluation       Based on the above components, the patient evaluation is determined to be of the following complexity level: LOW   Pain Rating:  No pain supine in bed    Activity Tolerance:   Good  Please refer to the flowsheet for vital signs taken during this treatment. After treatment patient left in no apparent distress:    Sitting in chair and Call bell within reach    COMMUNICATION/EDUCATION:   The patients plan of care was discussed with: Physical therapist and Registered nurse. Home safety education was provided and the patient/caregiver indicated understanding. This patients plan of care is appropriate for delegation to [facility].     Thank you for this referral.  Margret Gonzalez, OT  Time Calculation: 14 mins

## 2020-10-08 NOTE — DISCHARGE INSTRUCTIONS
DC Orders All Total Knees    Case Management for DC planning to Home HC .   - PT for 3 times a week for 1st week (4 PT visits to be scheduled), patient will transition to outpatient PT in 10 days post-operatively; WBAT. -  mg BID for 30 days post-operatively. - Remove AQUACEL bandage on the 7th day post-operatively (PLEASE reference discharge instructions INCISION CARE for additional information). - Remove staples at 2 weeks post-op; 10/21/20 .   - Follow up in Office in 2 weeks. After Hospital Care Plan:  Discharge Instructions Knee Replacement-Dr. Orville Sandoval    Patient Name: Thomas Negrete  Date of procedure: 10/7/2020   Procedure: Procedure(s):  RIGHT TOTAL KNEE REPLACEMENT  Surgeon: Nadir Black) and Role:     Herminia Zhang MD - Primary    PCP: Milind Andino MD  Date of discharge: No discharge date for patient encounter. Follow up appointments   Follow up with Dr. Orville Sandoval in 2 weeks. Call [phone] to make an appointment.  If home health has been arranged for you the agency will contact you to arrange dates/times for visits. Please call them if you do not hear from them within 24 hours after you are discharged    When to call your Orthopaedic Surgeon: Call [phone].  If you call after 5pm or on a weekend, the on call physician will be contacted   Unrelieved pain   Signs of infection-if your incision is red; continues to have drainage; drainage has a foul odor or if you have a persistent fever over 101 degrees   Signs of a blood clot in your leg-calf pain, tenderness, redness, swelling of lower leg    When to call your Primary Care Physician:   Concerns about medical conditions such as diabetes, high blood pressure, asthma, congestive heart failure   Call if blood sugars are elevated, persistent headache or dizziness, coughing or congestion, constipation or diarrhea, burning with urination, abnormal heart rate    When to call [id number] go to the nearest emergency room   Acute onset of chest pain, shortness of breath, difficulty breathing      Activity   Weight bearing as tolerated with walker or crutches. Refer to your handbook for instructions and pictures   Complete your Home Exercise Program daily as instructed by your therapist.  Refer to your handbook for instructions and pictures   Get up every one hour and walk (except at night when sleeping)   Do not drive or operate heavy machinery    Incision Care   The Aquacel (brown, waterproof) surgical dressing is to remain on your knee for 7 days. On the 7th day have someone gently peel the dressing off by carefully lifting the edge and stretching it slightly to break the adhesive seal   If the home health agency has not removed the Aquacel bandage by the 7th day please remove it yourself   You will have staples in your knee incision. They will be removed by the home health agency staff   If your Aquacel dressing comes loose/off before the 7th day, you may replace it with a dry sterile gauze dressing; change it daily. Once your incision is not draining, you may leave it open to air   You may take a shower with the Aquacel dressing in place. Once the Aquacel is removed, you may shower and get your incision wet but do not submerge your incision under water in a bath tub, hot tub or swimming pool for 6 weeks after surgery. Preventing blood clots    Take  mg BID for 30 days post-operatively.  Wear elastic stockings (TEDS) for 4 weeks.   You should remove them for approximately 1 hour daily for showering/sponge bathing    Pain management   Keep ice wrap in place except when walking; changing gel packs every 4 hours   Lie down and elevate your leg on pillows for about 30 minutes after walking to decrease swelling and pain    Do ankle pumps (10 repetitions) every hour while awake and get up frequently to walk    Take Tylenol 500mg every 6 hours for 2 weeks    Take narcotic pain pill as prescribed if needed. Take with food; avoid alcohol while taking pain medication. Decrease the amount of narcotic pain medication as your pain lessens     Diet   Resume usual diet; drink plenty of fluids; eat foods high in fiber   You may want to take a stool softener (such as Senokot-S or Colace) to prevent constipation while you are taking pain medication.   If constipation occurs, take a laxative (such as Dulcolax tablets, Milk of Magnesia, or a suppository)

## 2020-10-08 NOTE — PROGRESS NOTES
Bedside shift change report given to Alec (oncoming nurse) by Blaire Edwards (offgoing nurse). Report included the following information SBAR, Kardex, Intake/Output and MAR.

## 2020-10-08 NOTE — PROGRESS NOTES
ELIANA: Ortho [facility] patient. Plan is home with home health. Patient lives with a friend in [place]. Referral pending with [address] Management Interventions  PCP Verified by CM: Yes  Mode of Transport at Discharge: Other (see comment)(family/car)  Transition of Care Consult (CM Consult): Home Health, Discharge [address] [address]: No  Reason Outside [place]: Out of service area  [place] [address] [place] [place]: No  Discharge Durable Medical Equipment: No  Physical Therapy Consult: Yes  Occupational Therapy Consult: Yes  Speech Therapy Consult: No  Current Support Network: Other(lives with friend)  Confirm Follow Up Transport: Friends  The Patient and/or Patient Representative was Provided with a Choice of Provider and Agrees with the Discharge Plan?: Yes  Freedom of Choice List was Provided with Basic Dialogue that Supports the Patient's Individualized Plan of Care/Goals, Treatment Preferences and Shares the Quality Data Associated with the Providers?: Yes  Discharge Location  Discharge Placement: Home with home health     Reason for Admission:   RIGHT TOTAL KNEE REPLACEMENT                    RUR Score:   7%                  Plan for utilizing home health: The Plan for Transition of Care is related to the following treatment goals: home health    The Patient and/or patient representative  was provided with a choice of provider and agrees   with the discharge plan. [x] Yes [] No    Freedom of choice list was provided with basic dialogue that supports the patient's individualized plan of care/goals, treatment preferences and shares the quality data associated with the providers.  [x] Yes [] No        PCP: First and Last name:  Kirk Mathias MD   Name of Practice:    Are you a current patient: Yes/No: yes   Approximate date of last visit: few weeks ago   Can you participate in a virtual visit with your PCP:                     Current Advanced Directive/Advance Care Plan: AMD on file Transition of Care Plan:       Home with friend and home health.                  BONNY Lorenzo/CRM

## 2020-10-08 NOTE — ROUTINE PROCESS
Bedside shift change report given to Mandi Borden (oncoming nurse) by Stephanie Sloan (offgoing nurse). Report included the following information SBAR.

## 2020-10-08 NOTE — PROGRESS NOTES
TOTAL KNEE PROGRESS NOTE      Subjective:     Post-Operative Day: 1 Status Post right Total Knee Arthroplasty  Systemic or Specific Complaints:No Complaints    Objective:     Patient Vitals for the past 24 hrs:   BP Temp Pulse Resp SpO2   10/08/20 0836 138/71 97.8 °F (36.6 °C) 78 16 92 %   10/08/20 0213 133/60 98 °F (36.7 °C) 79 15 96 %   10/07/20 2034 (!) 143/84 98 °F (36.7 °C) 72 15 96 %   10/07/20 1714 138/61 97.9 °F (36.6 °C) 78 16    10/07/20 1614 (!) 136/58 97.8 °F (36.6 °C) 60 15    10/07/20 1454 (!) 128/99  82     10/07/20 1449 (!) 146/53 97.7 °F (36.5 °C) 75 15 98 %   10/07/20 1352 (!) 142/59 97.7 °F (36.5 °C) (!) 53 15 98 %   10/07/20 1300 (!) 133/53  (!) 59 15 100 %   10/07/20 1245 (!) 153/52  (!) 51 12 100 %   10/07/20 1230 (!) 149/50  (!) 55 18 97 %   10/07/20 1215 (!) 128/50  (!) 51 17 100 %   10/07/20 1200 138/78  62 18 100 %   10/07/20 1145 (!) 129/56  (!) 54 13 100 %   10/07/20 1130 (!) 155/63  63 16 100 %   10/07/20 1115 (!) 155/68  (!) 48 12 100 %   10/07/20 1100 (!) 134/53  (!) 44 14 100 %       General: alert, cooperative, no distress, appears stated age   Wound: Wound clean and dry no evidence of infection. , No Erythema, No Edema, No Drainage and Wound Intact   Motion: Extension: Full Extension   DVT Exam: No evidence of DVT seen on physical exam.  Negative Orestes's sign. No cords or calf tenderness. No significant calf/ankle edema.      Data Review:    Recent Results (from the past 24 hour(s))   GLUCOSE, POC    Collection Time: 10/07/20  4:44 PM   Result Value Ref Range    Glucose (POC) 134 (H) 65 - 100 mg/dL    Performed by Lennox Barlow POC    Collection Time: 10/07/20 10:42 PM   Result Value Ref Range    Glucose (POC) 128 (H) 65 - 100 mg/dL    Performed by Andrei Sevilla 1045    Collection Time: 10/08/20  2:21 AM   Result Value Ref Range    HGB 9.5 (L) 11.5 - 56.8 g/dL   METABOLIC PANEL, BASIC    Collection Time: 10/08/20  2:21 AM   Result Value Ref Range Sodium 141 136 - 145 mmol/L    Potassium 4.2 3.5 - 5.1 mmol/L    Chloride 110 (H) 97 - 108 mmol/L    CO2 25 21 - 32 mmol/L    Anion gap 6 5 - 15 mmol/L    Glucose 105 (H) 65 - 100 mg/dL    BUN 18 6 - 20 MG/DL    Creatinine 0.96 0.55 - 1.02 MG/DL    BUN/Creatinine ratio 19 12 - 20      GFR est AA >60 >60 ml/min/1.73m2    GFR est non-AA 55 (L) >60 ml/min/1.73m2    Calcium 8.2 (L) 8.5 - 10.1 MG/DL   GLUCOSE, POC    Collection Time: 10/08/20  6:42 AM   Result Value Ref Range    Glucose (POC) 116 (H) 65 - 100 mg/dL    Performed by Violeta Reddy          Assessment:     Status Post right Total Knee Arthroplasty. Doing well postoperatively. . Bandage aquacel, clean/dry. Labs stable. PT progressing, doing well with standing/walking. Pain control WNL. Plan:     Continues current post-op course  Continue PT per protocol  Anticipate Home [facility]. discharge tomorrow with PT clearance.

## 2020-10-08 NOTE — PROGRESS NOTES
Problem: Mobility Impaired (Adult and Pediatric)  Goal: *Acute Goals and Plan of Care (Insert Text)  Description: FUNCTIONAL STATUS PRIOR TO ADMISSION: Patient was independent and active without use of DME.    HOME SUPPORT PRIOR TO ADMISSION: The patient lived with friend but did not require assist.    Physical Therapy Goals  Initiated 10/7/2020    1. Patient will move from supine to sit and sit to supine  and roll side to side in bed with modified independence within 4 days. 2. Patient will perform sit to stand with modified independence within 4 days. 3. Patient will ambulate with modified independence for 150 feet with the least restrictive device within 4 days. 4. Patient will ascend/descend 4 stairs with 1 handrail(s) with modified independence within 4 days. 5. Patient will perform home exercise program per protocol with modified independence within 4 days. 6. Patient will demonstrate AROM 0-90 degrees in operative joint within 4 days. Outcome: Progressing Towards Goal   PHYSICAL THERAPY TREATMENT  Patient: Anthony Mckee (58 y.o. female)  Date: 10/8/2020  Diagnosis: Right knee DJD [M17.11]   Primary localized osteoarthritis of right knee  Procedure(s) (LRB):  RIGHT TOTAL KNEE REPLACEMENT (Right) 1 Day Post-Op  Precautions: Fall, WBAT  Chart, physical therapy assessment, plan of care and goals were reviewed. ASSESSMENT  Patient continues with skilled PT services and is progressing towards goals. Patient requiring less assist for bed mobility, transfer and amb - pt moves slowly - but improving with OOB mobility and endurance. Anticipate pt will be ready for discharge on 10/9 - need to practice on stairs. Current Level of Function Impacting Discharge (mobility/balance): Min assist for sit to stand; CGA for amb with RW    Other factors to consider for discharge:   Will be staying on first floor home initially; 1 step to enter         PLAN :  Patient continues to benefit from skilled intervention to address the above impairments. Continue treatment per established plan of care. to address goals. Recommendation for discharge: (in order for the patient to meet his/her long term goals)  Physical therapy at least 2 days/week in the home     This discharge recommendation:  Has been made in collaboration with the attending provider and/or case management    IF patient discharges home will need the following DME: patient owns DME required for discharge       SUBJECTIVE:   Patient stated I'm looking forward to going home tomorrow.     OBJECTIVE DATA SUMMARY:   Critical Behavior:  Neurologic State: Alert  Orientation Level: Oriented X4  Cognition: Appropriate for age attention/concentration  Safety/Judgement: Good awareness of safety precautions    Range of Motion:  AROM: Within functional limits  PROM: Within functional limits                      Functional Mobility Training:  Bed Mobility:     Supine to Sit: Minimum assistance;Assist x1(HOB slightly elevated - from right side of bed )        Transfers:  Sit to Stand: Minimum assistance(verbal cues to lean forward)  Stand to Sit: Contact guard assistance        Bed to Chair: Minimum assistance;Assist x1                    Balance:  Sitting: Intact; Without support  Standing: Impaired; With support  Standing - Static: Good;Constant support  Standing - Dynamic : Good;Constant support  Ambulation/Gait Training:  Distance (ft): 75 Feet (ft)  Assistive Device: Walker, rolling;Gait belt  Ambulation - Level of Assistance: Contact guard assistance; Additional time        Gait Abnormalities: Antalgic;Decreased step clearance; Step to gait(able to increase stride left with verbal cues)  Right Side Weight Bearing: As tolerated  Left Side Weight Bearing: Full  Base of Support: Widened;Shift to left  Stance: Right decreased  Speed/Corrie: Slow  Step Length: Right shortened;Left shortened  Swing Pattern: Right asymmetrical     Interventions: Safety awareness training; Tactile cues; Verbal cues; Visual/Demos              Pain Rating:  Right knee 5/10    Activity Tolerance:   Good  - POD # 1 - progressing with OOB/amb  Please refer to the flowsheet for vital signs taken during this treatment. After treatment patient left in no apparent distress:   Sitting in chair, Call bell within reach and Caregiver / family present ; right leg with ice knee and propped on pillows    COMMUNICATION/COLLABORATION:   The patients plan of care was discussed with: Registered nurse.      Kya Pac, PT   Time Calculation: 30 mins

## 2020-10-09 VITALS
HEIGHT: 66 IN | OXYGEN SATURATION: 96 % | HEART RATE: 74 BPM | BODY MASS INDEX: 24.94 KG/M2 | TEMPERATURE: 97.4 F | WEIGHT: 155.2 LBS | RESPIRATION RATE: 16 BRPM | DIASTOLIC BLOOD PRESSURE: 57 MMHG | SYSTOLIC BLOOD PRESSURE: 133 MMHG

## 2020-10-09 LAB
GLUCOSE BLD STRIP.AUTO-MCNC: 122 MG/DL (ref 65–100)
GLUCOSE BLD STRIP.AUTO-MCNC: 142 MG/DL (ref 65–100)
SERVICE CMNT-IMP: ABNORMAL
SERVICE CMNT-IMP: ABNORMAL

## 2020-10-09 PROCEDURE — 74011250637 HC RX REV CODE- 250/637: Performed by: PHYSICIAN ASSISTANT

## 2020-10-09 PROCEDURE — 97535 SELF CARE MNGMENT TRAINING: CPT

## 2020-10-09 PROCEDURE — 97116 GAIT TRAINING THERAPY: CPT

## 2020-10-09 PROCEDURE — 82962 GLUCOSE BLOOD TEST: CPT

## 2020-10-09 PROCEDURE — 97110 THERAPEUTIC EXERCISES: CPT

## 2020-10-09 RX ADMIN — DOCUSATE SODIUM 50MG AND SENNOSIDES 8.6MG 1 TABLET: 8.6; 5 TABLET, FILM COATED ORAL at 08:13

## 2020-10-09 RX ADMIN — POLYETHYLENE GLYCOL 3350 17 G: 17 POWDER, FOR SOLUTION ORAL at 08:13

## 2020-10-09 RX ADMIN — CELECOXIB 200 MG: 200 CAPSULE ORAL at 08:13

## 2020-10-09 RX ADMIN — LORATADINE 10 MG: 10 TABLET ORAL at 08:13

## 2020-10-09 RX ADMIN — ACETAMINOPHEN 650 MG: 325 TABLET ORAL at 01:10

## 2020-10-09 RX ADMIN — HYDROCHLOROTHIAZIDE 12.5 MG: 25 TABLET ORAL at 08:13

## 2020-10-09 RX ADMIN — LOSARTAN POTASSIUM 100 MG: 50 TABLET, FILM COATED ORAL at 08:13

## 2020-10-09 RX ADMIN — ASPIRIN 325 MG: 325 TABLET, DELAYED RELEASE ORAL at 08:12

## 2020-10-09 RX ADMIN — GABAPENTIN 300 MG: 300 CAPSULE ORAL at 08:13

## 2020-10-09 RX ADMIN — OXYCODONE 10 MG: 5 TABLET ORAL at 08:12

## 2020-10-09 RX ADMIN — OXYCODONE 5 MG: 5 TABLET ORAL at 05:27

## 2020-10-09 RX ADMIN — PANTOPRAZOLE SODIUM 40 MG: 40 TABLET, DELAYED RELEASE ORAL at 06:43

## 2020-10-09 RX ADMIN — ACETAMINOPHEN 650 MG: 325 TABLET ORAL at 08:12

## 2020-10-09 RX ADMIN — OXYCODONE 5 MG: 5 TABLET ORAL at 01:10

## 2020-10-09 RX ADMIN — OXYCODONE 10 MG: 5 TABLET ORAL at 11:31

## 2020-10-09 NOTE — PROGRESS NOTES
Problem: Knee Replacement: Post-Op Day 2  Goal: *Optimal pain control with oral analgesia  Outcome: Progressing Towards Goal  Goal: *Tolerating diet  Outcome: Progressing Towards Goal

## 2020-10-09 NOTE — PROGRESS NOTES
ELIANA: Ortho [facility] patient. Plan is home with home health. Patient lives with a friend in [place]. [facility] can accept patient for start of care for PT Monday.     BONNY Lorenzo/CRM

## 2020-10-09 NOTE — PROGRESS NOTES
Problem: Falls - Risk of  Goal: *Absence of Falls  Description: Document Mei Ruiz Fall Risk and appropriate interventions in the flowsheet. Outcome: Progressing Towards Goal  Note: Fall Risk Interventions:  Mobility Interventions: Communicate number of staff needed for ambulation/transfer, Patient to call before getting OOB    Medication Interventions: Evaluate medications/consider consulting pharmacy, Patient to call before getting OOB, Teach patient to arise slowly    Elimination Interventions: Call light in reach, Patient to call for help with toileting needs, Toileting schedule/hourly rounds     Problem: Knee Replacement: Post-Op Day 1  Goal: Activity/Safety  10/8/2020 2145 by Hi Plan  Outcome: Progressing Towards Goal  Patient ambulated safely with gait belt and walker in hallway with PT. Goal: Nutrition/Diet  10/8/2020 2145 by Hi Plan  Outcome: Progressing Towards Goal  Patient tolerating diet. No complaints of nausea or vomiting. Goal: Psychosocial  10/8/2020 2145 by Hi Plan  Outcome: Progressing Towards Goal  Patient is calm and cooperative. Goal: *Optimal pain control at patient's stated goal  10/8/2020 2145 by Hi Plan  Outcome: Progressing Towards Goal  RN assessing patient's pain levels every 4 hours and reassessing within an hour of intervention.

## 2020-10-09 NOTE — PROGRESS NOTES
Patient utilizing IS up to 1500, pumping her ankles, ready to work with PT today, Leticia Robertson discussed DC with the patient today, patient understands the goals of today to work with PT, use IS, maintain pain control using ice, elevation, and medications.  Patient aware of side effects to look out for with narcotics, mainly constipation, patient is on stool softener and miralax

## 2020-10-09 NOTE — PROGRESS NOTES
Problem: Self Care Deficits Care Plan (Adult)  Goal: *Acute Goals and Plan of Care (Insert Text)  Description: Occupational Therapy Goals  Initiated: 10/8/2020   1. Patient will perform grooming with supervision/set-up standing at sink within 3 day(s). 2.  Patient will perform bathing with supervision/set-up from chair within 3 day(s). 3.  Patient will perform upper body dressing and lower body dressing with supervision/set-up within 3 day(s). 4.  Patient will perform toilet transfers with supervision/set-up within 3 day(s). 5.  Patient will perform all aspects of toileting with supervision/set-up within 3 day(s). FUNCTIONAL STATUS PRIOR TO ADMISSION: Independent with all activities prior to admission. HOME SUPPORT: Friend      Outcome: Progressing Towards Goal     OCCUPATIONAL THERAPY TREATMENT  Patient: Anthony Mckee (56 y.o. female)  Date: 10/9/2020  Diagnosis: Right knee DJD [M17.11]   Primary localized osteoarthritis of right knee  Procedure(s) (LRB):  RIGHT TOTAL KNEE REPLACEMENT (Right) 2 Days Post-Op  Precautions: Fall, WBAT  Chart, occupational therapy assessment, plan of care, and goals were reviewed. ASSESSMENT  Patient continues with skilled OT services and is progressing towards goals. Pt received supine in bed. She progressed to EOB for dressing activities. Provided education for how to don TEDS for home. Caregiver present and reports understanding of education provided. Pt completed dressing activities with min A for LB activities. She will have support from her caregiver at home as needed. Pt is independent with education provided regarding progression of ADL activities and LB access. She has no further needs for continued OT intervention. Recommend home with support from friend.      Current Level of Function Impacting Discharge (ADLs): min A for LB dressing    Other factors to consider for discharge: pain, decreased ROM in knee         PLAN :  Patient continues to benefit from skilled intervention to address the above impairments. Continue treatment per established plan of care. to address goals. Recommend with staff: OOB to chair TID for meals. Recommend progression to and from bathroom with use of walker and gait belt for toileting. Recommend next OT session: tub/shower transfers, IADL education    Recommendation for discharge: (in order for the patient to meet his/her long term goals)  No skilled occupational therapy/ follow up rehabilitation needs identified at this time. This discharge recommendation:  Has been made in collaboration with the attending provider and/or case management    IF patient discharges home will need the following DME: none       SUBJECTIVE:   Patient stated I am feeling so much better today.     OBJECTIVE DATA SUMMARY:   Cognitive/Behavioral Status:  Neurologic State: Alert  Orientation Level: Oriented X4  Cognition: Appropriate decision making; Appropriate for age attention/concentration; Appropriate safety awareness; Follows commands             Functional Mobility and Transfers for ADLs:  Bed Mobility:  Supine to Sit: Additional time;Modified independent  Sit to Supine: Modified independent; Additional time    Transfers:  Sit to Stand: Additional time; Adaptive equipment; Modified independent;Supervision(mod I from chair, supervision from low bed)     Bed to Chair: Supervision; Adaptive equipment; Additional time(for safety in hospital environment)    Balance:  Sitting: Intact; Without support  Standing: Intact; With support(hands on the RW)    ADL Intervention:  Pt provided with education and training for donning TEDS for home. Pt's caregiver reports understanding of training provided at this time. Progressed to EOB to complete dressing activities. Pt was able to transfer to EOB with supervision. Pt did well with bed mobility. She worked on LB dressing needing min A for LB access at this time.   She has decreased ROM impairing LB access at this time. Pt encouraged to continue to work on LB access for home to maximize independence with LB access. Pt then returned to supine back in bed following intervention. Pain:  No pain    Activity Tolerance:   Good  Please refer to the flowsheet for vital signs taken during this treatment. After treatment patient left in no apparent distress:   Supine in bed and Call bell within reach    COMMUNICATION/COLLABORATION:   The patients plan of care was discussed with: Physical therapist and Registered nurse.      Maco Urena OT  Time Calculation: 25 mins

## 2020-10-09 NOTE — PROGRESS NOTES
Problem: Mobility Impaired (Adult and Pediatric)  Goal: *Acute Goals and Plan of Care (Insert Text)  Description: FUNCTIONAL STATUS PRIOR TO ADMISSION: Patient was independent and active without use of DME.    HOME SUPPORT PRIOR TO ADMISSION: The patient lived with friend but did not require assist.    Physical Therapy Goals  Initiated 10/7/2020    1. Patient will move from supine to sit and sit to supine  and roll side to side in bed with modified independence within 4 days. 2. Patient will perform sit to stand with modified independence within 4 days. 3. Patient will ambulate with modified independence for 150 feet with the least restrictive device within 4 days. 4. Patient will ascend/descend 4 stairs with 1 handrail(s) with modified independence within 4 days. 5. Patient will perform home exercise program per protocol with modified independence within 4 days. 6. Patient will demonstrate AROM 0-90 degrees in operative joint within 4 days. Outcome: Progressing Towards Goal   PHYSICAL THERAPY TREATMENT  Patient: Ania Giang (13 y.o. female)  Date: 10/9/2020  Diagnosis: Right knee DJD [M17.11]   Primary localized osteoarthritis of right knee  Procedure(s) (LRB):  RIGHT TOTAL KNEE REPLACEMENT (Right) 2 Days Post-Op  Precautions: Fall, WBAT  Chart, physical therapy assessment, plan of care and goals were reviewed. ASSESSMENT  Patient continues with skilled PT services and is progressing towards goals. Although her mobility is still very slow, she is making excellent progress with her balance and distance with gait. She is limited in her R knee ROM but improved from yesterday and she is achieving better motion in her exercises, as well. After stair training, she was able to asc/desc 4 steps with supervision for safety and then ambulated back to her room. Roommate was present for session and actively engaged.   Reviewed activity recommendations and restrictions and safety considerations for home with patient and roommate. She is clear for D/C home from a PT standpoint and RN is aware. Current Level of Function Impacting Discharge (mobility/balance): supervision for safety in hospital environment    Other factors to consider for discharge: long drive home         PLAN :  Patient continues to benefit from skilled intervention to address the above impairments. Continue treatment per established plan of care. to address goals. Recommendation for discharge: (in order for the patient to meet his/her long term goals)  Physical therapy at least 2 days/week in the home     This discharge recommendation:  Has been made in collaboration with the attending provider and/or case management    IF patient discharges home will need the following DME: patient owns DME required for discharge       SUBJECTIVE:   Patient stated I feel ready to go.     OBJECTIVE DATA SUMMARY:   Critical Behavior:  Neurologic State: Alert  Orientation Level: Oriented X4  Cognition: Appropriate decision making, Appropriate for age attention/concentration, Appropriate safety awareness, Follows commands  Safety/Judgement: Good awareness of safety precautions  Functional Mobility Training:  Bed Mobility:     Supine to Sit: Additional time;Modified independent  Sit to Supine: Modified independent; Additional time           Transfers:  Sit to Stand: Additional time; Adaptive equipment; Modified independent;Supervision(mod I from chair, supervision from low bed)  Stand to Sit: Adaptive equipment; Additional time;Modified independent        Bed to Chair: Supervision; Adaptive equipment; Additional time(for safety in hospital environment)                    Balance:  Sitting: Intact; Without support  Standing: Intact; With support(hands on the RW)  Ambulation/Gait Training:  Distance (ft): 100 Feet (ft)  Assistive Device: Gait belt;Walker, rolling  Ambulation - Level of Assistance: Modified independent; Adaptive equipment; Additional time(supervision for safety in hospital environment)        Gait Abnormalities: Antalgic;Decreased step clearance; Step to gait(starting to step through)  Right Side Weight Bearing: As tolerated  Left Side Weight Bearing: Full  Base of Support: Widened;Shift to left  Stance: Right decreased  Speed/Corrie: Slow  Step Length: Right shortened;Left shortened  Swing Pattern: Right asymmetrical     Interventions: Safety awareness training; Tactile cues; Verbal cues; Visual/Demos           Stairs:  Number of Stairs Trained: 4  Stairs - Level of Assistance: Supervision; Additional time; Adaptive equipment   Rail Use: Both    Therapeutic Exercises: Ankle pumps, quad sets, heel slides, SAQ, stretching, SLR  Pain Rating:  Decreased pain and improved with pillow support under lower leg and ice in place    Activity Tolerance:   Good  Please refer to the flowsheet for vital signs taken during this treatment. After treatment patient left in no apparent distress:   Sitting in chair, Call bell within reach, Caregiver / family present, Side rails x 3, and ice in place R knee    COMMUNICATION/COLLABORATION:   The patients plan of care was discussed with: Occupational therapist, Registered nurse, and Case management.      Margarita Robertson, PT   Time Calculation: 45 mins

## 2020-10-09 NOTE — PROGRESS NOTES
Bedside shift change report given to Lennox Ji RN (oncoming nurse) by Matt Bender RN (offgoing nurse). Report included the following information SBAR, Kardex, Intake/Output, MAR and Recent Results.

## 2020-10-09 NOTE — NURSE NAVIGATOR
[address] Bundle Handoff FROM:                                TO: Edgard Hoffman 54 
                                                    ([facility] [place] [facility] [place] 5S1 ORTHO JOINT 
[place] [address] [place] [address] Dept: [phone] Loc: [phone] Room#:  576/ Nurse Navigator:  Kwan Schilling RN 
  
 
  SITUATION  
  
ASAScore: ASA 2 - Patient with mild systemic disease with no functional limitations Admitted:  10/7/2020  Hospital Day: 3      Attending Provider:  Felicita Pastor MD    
Consultations:  None PCP:  Tiffanie Munoz MD   [phone] Admitting Dx:  Right knee DJD [M17.11] Principal Problem: 
  Primary localized osteoarthritis of right knee (9/25/2020) Active Problems: 
  Right knee DJD (10/7/2020) 2 Days Post-Op of  
Procedure(s): RIGHT TOTAL KNEE REPLACEMENT  
BY: Felicita Pastor MD             ON: 10/7/2020 Code Status: Full Code             Advance Directive? Yes Not W Pt (Send w/patient) Isolation:  There are currently no Active Isolations       MDRO: No current active infections BACKGROUND Allergies: Allergies Allergen Reactions  Novocain [Procaine] Other (comments) FAINTS Past Medical History:  
Diagnosis Date  Arthritis  Benign colon polyp 2020  Diabetes ([address] Pre-Diabetes - Diet & Exercise Controlled  Diabetes mellitus  GERD (gastroesophageal reflux disease)  Headache(784.0)  High cholesterol  Hypertension  Neuropathy  Osteopenia Past Surgical History:  
Procedure Laterality Date  HX CARPAL TUNNEL RELEASE  HX CATARACT REMOVAL    
 BILATERAL  
 HX GI    
 COLONOSCOPY  
 HX LUMBAR LAMINECTOMY  2013  HX ORTHOPAEDIC Bilateral 1985 CARPAL ARIEL  HX ROTATOR CUFF REPAIR Left  HX TONSILLECTOMY Prior to Admission Medications Prescriptions Last Dose Informant Patient Reported? Taking? Calcium-Cholecalciferol, D3, (Calcium 600 with Vitamin D3) 600 mg(1,500mg) -400 unit cap 10/6/2020 at 1200  Yes Yes Sig: Take  by mouth. Hydrochlorothiazide 12.5 mg tablet 10/6/2020 at 0800  Yes Yes Sig: Take 12.5 mg by mouth daily. cholecalciferol, vitamin D3, (Vitamin D3) 50 mcg (2,000 unit) tab 10/6/2020 at 0800  Yes Yes Sig: Take 4,000 Units by mouth daily. denosumab (Prolia) 60 mg/mL injection 2020  Yes No  
Si mg by SubCUTAneous route every 6 months. diclofenac (VOLTAREN) 1 % gel 2020 at Unknown time  No Yes Sig: Apply  to affected area four (4) times daily. Patient taking differently: Apply  to affected area four (4) times daily as needed. esomeprazole (NEXIUM) 40 mg capsule 10/7/2020 at 0500  Yes Yes Sig: Take 40 mg by mouth daily. gabapentin (NEURONTIN) 300 mg capsule 10/7/2020 at 0500  No Yes Sig: TAKE 2 CAPSULES BY MOUTH IN THE MORNING, AT NOON, AT SUPPER, AND AT BEDTIME Patient taking differently: BY MOUTH IN THE MORNING, AT NOON, AT SUPPER, AND AT BEDTIME  
lactase (LACTAID PO) 2020 at Unknown time  Yes Yes Sig: Take  by mouth daily as needed. loratadine 10 mg cap 10/7/2020 at 0500  Yes Yes Sig: Take 1 Tab by mouth daily as needed. losartan (COZAAR) 100 mg tablet 10/6/2020 at 06943  Yes Yes Sig: Take 100 mg by mouth daily. metFORMIN (GLUCOPHAGE) 1,000 mg tablet 10/6/2020 at 0800  Yes Yes Sig: Take 1,000 mg by mouth two (2) times daily (with meals). AFTER BREAKFAST AND AFTER SUPPER  
multivit-min/iron/folic/lutein (CENTRUM SILVER WOMEN PO) 2020 at Unknown time  Yes Yes Sig: Take 1 Tab by mouth daily. mupirocin (BACTROBAN) 2 % ointment 10/7/2020 at 0500  No Yes Sig: by Both Nostrils route two (2) times a day for 7 days. mupirocin (BACTROBAN) 2 % ointment 10/7/2020 at 0500  Yes Yes Sig: by Both Nostrils route two (2) times a day. Indications: MSSA  
rosuvastatin (CRESTOR) 20 mg tablet 10/6/2020 at 2000  Yes Yes Sig: Take 20 mg by mouth nightly. Facility-Administered Medications: None Vaccinations: There is no immunization history on file for this patient. ASSESSMENT Age: 80 y.o. Gender: female Height: Height: 5' 5.5\" (166.4 cm)                    Weight:Weight: 70.4 kg (155 lb 3.3 oz) Patient Vitals for the past 8 hrs: 
 Temp Pulse Resp BP SpO2  
10/09/20 0805 97.4 °F (36.3 °C) 74 16 (!) 133/57 96 % 10/09/20 0154 99 °F (37.2 °C) 76 15 (!) 113/56 94 % Active Orders Diet DIET DIABETIC CONSISTENT CARB Regular Orientation: Orientation Level: Oriented X4 Active Lines/Drains:  (Peg Tube / Mcconnell / CL or S/L?):no 
 
Urinary Status: Voiding      Last BM: Last Bowel Movement Date: 10/07/20(per patient no BM in two days) Skin Integrity: Incision (comment), Intact(aquacel with scant drainage R knee) Mobility: Slightly limited Weight Bearing Status: WBAT (Weight Bearing as Tolerated) Gait Training Assistive Device: Walker, rolling, Gait belt Ambulation - Level of Assistance: Contact guard assistance, Additional time Distance (ft): 75 Feet (ft) Interventions: Safety awareness training, Tactile cues, Verbal cues, Visual/Demos On Anticoagulation? YES  Aspirin Pain Medications given:  Oxycodone Lab Results Component Value Date/Time Glucose 105 (H) 10/08/2020 02:21 AM  
 Hemoglobin A1c 5.7 (H) 09/29/2020 12:28 PM  
 INR 1.0 09/29/2020 12:28 PM  
 INR 1.0 02/15/2013 12:17 PM  
 HGB 9.5 (L) 10/08/2020 02:21 AM  
 HGB 11.3 (L) 09/29/2020 12:28 PM  
 HGB 10.9 (L) 03/01/2013 03:45 AM  
 HGB 10.3 (L) 02/28/2013 03:50 AM  
 
 
Readmission Risks: 
Score: RECOMMENDATION See After Visit Summary (AVS) for: · Discharge instructions · After [facility] · Medication Reconciliation [facility] Nurse Navigator JOCELYNN Ovalle, RN-BC Office  [phone] Cell      [phone] Fax      [phone] 
[email or web address]

## 2020-10-09 NOTE — PROGRESS NOTES
TOTAL KNEE PROGRESS NOTE        Subjective:      Post-Operative Day: 2 Status Post right Total Knee Arthroplasty  Systemic or Specific Complaints:No Complaints     Objective:      Patient Vitals for the past 24 hrs:    BP Temp Pulse Resp SpO2   10/09/20 0154 (!) 113/56 99 °F (37.2 °C) 76 15 94 %   10/08/20 2128 (!) 152/53 100.3 °F (37.9 °C) 81 16 96 %   10/08/20 1515 (!) 150/55 98.8 °F (37.1 °C) 74 16 96 %   10/08/20 0836 138/71 97.8 °F (36.6 °C) 78 16 92 %         General: alert, cooperative, no distress, appears stated age   Wound: Wound clean and dry no evidence of infection. , No Erythema, No Edema, No Drainage and Wound Intact   Motion: Extension: Full Extension   DVT Exam: No evidence of DVT seen on physical exam.  Negative Orestes's sign. No cords or calf tenderness. No significant calf/ankle edema.      Data Review:    Recent Results         Recent Results (from the past 24 hour(s))   GLUCOSE, POC     Collection Time: 10/08/20 11:51 AM   Result Value Ref Range     Glucose (POC) 136 (H) 65 - 100 mg/dL     Performed by [employer]     GLUCOSE, POC     Collection Time: 10/08/20  5:11 PM   Result Value Ref Range     Glucose (POC) 153 (H) 65 - 100 mg/dL     Performed by Brand Thunder     GLUCOSE, POC     Collection Time: 10/08/20 10:04 PM   Result Value Ref Range     Glucose (POC) 173 (H) 65 - 100 mg/dL     Performed by Geena Benjamin  PCT     GLUCOSE, POC     Collection Time: 10/09/20  6:40 AM   Result Value Ref Range     Glucose (POC) 122 (H) 65 - 100 mg/dL     Performed by David Velazco                Assessment:      Status Post right Total Knee Arthroplasty. Doing well postoperatively. . Bandage aquacel, clean/dry. Labs stable. PT progressing well, successful with stairs. Pain control WNL.       Plan:      Continues current post-op course  Continue PT per protocol  Plan to discharge to [facility] this afternoon as cleared by PT

## 2020-10-09 NOTE — ACP (ADVANCE CARE PLANNING)
TOTAL KNEE PROGRESS NOTE Subjective:  
 
Post-Operative Day: 2 Status Post right Total Knee Arthroplasty Systemic or Specific Complaints:No Complaints Objective:  
 
Patient Vitals for the past 24 hrs: 
 BP Temp Pulse Resp SpO2  
10/09/20 0154 (!) 113/56 99 °F (37.2 °C) 76 15 94 % 10/08/20 2128 (!) 152/53 100.3 °F (37.9 °C) 81 16 96 % 10/08/20 1515 (!) 150/55 98.8 °F (37.1 °C) 74 16 96 % 10/08/20 0836 138/71 97.8 °F (36.6 °C) 78 16 92 % General: alert, cooperative, no distress, appears stated age Wound: Wound clean and dry no evidence of infection. , No Erythema, No Edema, No Drainage and Wound Intact Motion: Extension: Full Extension DVT Exam: No evidence of DVT seen on physical exam. 
Negative Orestes's sign. No cords or calf tenderness. No significant calf/ankle edema. Data Review:   
Recent Results (from the past 24 hour(s)) GLUCOSE, POC Collection Time: 10/08/20 11:51 AM  
Result Value Ref Range Glucose (POC) 136 (H) 65 - 100 mg/dL Performed by Naveen Zarco GLUCOSE, POC Collection Time: 10/08/20  5:11 PM  
Result Value Ref Range Glucose (POC) 153 (H) 65 - 100 mg/dL Performed by [employer] GLUCOSE, POC Collection Time: 10/08/20 10:04 PM  
Result Value Ref Range Glucose (POC) 173 (H) 65 - 100 mg/dL Performed by Hanna Elliott  [facility] GLUCOSE, POC Collection Time: 10/09/20  6:40 AM  
Result Value Ref Range Glucose (POC) 122 (H) 65 - 100 mg/dL Performed by Ganesh Jackson Assessment:  
 
Status Post right Total Knee Arthroplasty. Doing well postoperatively. . Bandage aquacel, clean/dry. Labs stable. PT progressing well, successful with stairs. Pain control WNL. Plan:  
 
Continues current post-op course Continue PT per protocol Plan to discharge to [facility] this afternoon as cleared by PT

## 2020-10-09 NOTE — ROUTINE PROCESS
Bedside shift change report given to Miguel Carvajal (oncoming nurse) by Otto Allen (offgoing nurse). Report included the following information SBAR.

## 2020-10-09 NOTE — PROGRESS NOTES
Provided and reviewed discharge instructions with the patient and her friend. Script sent to her verified pharmacy. Patient and her friend watched the discharge video. Teach back method utilized for medication education of side effects. Patient and friend verbalize understanding and had opportunities to ask questions.

## 2020-10-12 NOTE — DISCHARGE SUMMARY
Discharge Summary    Physician: Maria Esther Zhang MD    Physician Assistant: Lisbeth Figueroa PA-C    Admit Diagnosis:  Right knee DJD [M17.11]    Final Diagnosis:   1. Advanced degenerative arthritis of right knee . Procedure: Right total knee replacement    Complications: None. History of Present Illness: The patient has a long-standing history of pain within the right knee . The patient has severe degenerative arthritis of the right knee and has exhausted conservative therapy at this time including prior intra-articular injections, activity modifications, OTC NSAIDS. The patient has been limited in their ability to perform ADLs, walk short distances or climb steps appropriately. The patient presents for the above-mentioned procedure. The patient has been cleared from a medical and cardiac standpoint. Past Medical History:  Recorded in the chart. Physical Examination: Also recorded in the chart. The patient is noted for ambulating with an antalgic gait favoring the right side. Examination of the right knee reveals a ROM of 8-114 degrees. Tenderness: diffuse  Crepitus: diffuse  Any attempts to move the knee is painful. Skin intact. The cruciate and collateral ligaments are stable. No effusion. No sign of infection. No ecchymosis or erythema. No cellulitis or rash. No calf pain, swelling, or other evidence of DVT. I detect no obvious motor or sensory deficits in the lower extremities. The extensor mechanism is intact. The neurovascular status is intact . X-rays reveal moderate to severe degenerative changes.  No fractures or evidence of metastatic disease. Course in the Hospital:  The patient was admitted to the hospital.  The Pt. Underwent a right total knee replacement . Postoperatively, the pt. did well. The pt. Remained stable from a medical standpoint. The patient ambulated, WBAT weightbearing within the hospital with Physical Therapy.  The patient continued with this therapy at [facility] ([facility] with PT. The patient began taking  mg BID post-operatively for DVT prophylaxis and will continue on this for 30 days. At the time of discharge, there was no evidence of any DVT noted. The patient had negative Homans signs bilateral lower extremities. At the time of discharge, the patient's right knee incision appeared with staples intact. No signs of infection or inflammation noted. The patient will follow up in our office in 2-1/2 weeks. DC med list:  Discharge Medication List as of 10/9/2020 12:48 PM      START taking these medications    Details   aspirin (ASPIRIN) 325 mg tablet Take 1 Tab by mouth two (2) times a day for 30 days. , No Print, Disp-60 Tab,R-0      oxyCODONE IR (ROXICODONE) 5 mg immediate release tablet Take 1-2 Tabs by mouth every four (4) hours as needed for Pain for up to 7 days. Max Daily Amount: 60 mg., Normal, Disp-60 Tab,R-0         CONTINUE these medications which have NOT CHANGED    Details   Calcium-Cholecalciferol, D3, (Calcium 600 with Vitamin D3) 600 mg(1,500mg) -400 unit cap Take  by mouth., Historical Med      metFORMIN (GLUCOPHAGE) 1,000 mg tablet Take 1,000 mg by mouth two (2) times daily (with meals). AFTER BREAKFAST AND AFTER SUPPER, Historical Med      lactase (LACTAID PO) Take  by mouth daily as needed., Historical Med      multivit-min/iron/folic/lutein (CENTRUM SILVER WOMEN PO) Take 1 Tab by mouth daily. , Historical Med      cholecalciferol, vitamin D3, (Vitamin D3) 50 mcg (2,000 unit) tab Take 4,000 Units by mouth daily. , Historical Med      loratadine 10 mg cap Take 1 Tab by mouth daily as needed., Historical Med      denosumab (Prolia) 60 mg/mL injection 60 mg by SubCUTAneous route every 6 months., Historical Med      gabapentin (NEURONTIN) 300 mg capsule TAKE 2 CAPSULES BY MOUTH IN THE MORNING, AT NOON, AT SUPPER, AND AT BEDTIME, Normal, Disp-720 Cap,R-5Not to exceed 5 additional fills before 09/30/2020      diclofenac (VOLTAREN) 1 % gel Apply  to affected area four (4) times daily. , Sample, Disp-2 Each, R-0      esomeprazole (NEXIUM) 40 mg capsule Take 40 mg by mouth daily. , Historical Med      rosuvastatin (CRESTOR) 20 mg tablet Take 20 mg by mouth nightly. Historical Med, 20 mg      Hydrochlorothiazide 12.5 mg tablet Take 12.5 mg by mouth daily. Historical Med, 12.5 mg      losartan (COZAAR) 100 mg tablet Take 100 mg by mouth daily.   Historical Med, 100 mg         STOP taking these medications       mupirocin (BACTROBAN) 2 % ointment Comments:   Reason for Stopping:         mupirocin (BACTROBAN) 2 % ointment Comments:   Reason for Stopping:               Patient Disposition: Home  Followup Care:  Discharge Condition: good  PT/OT per Home Health  Regular Diet  As directed    Follow-up Information     Follow up With Specialties Details Why Contact Info    [facility], [address] [place] [id number]  [address] [place] [address]   for home health [phone]                  [place], [place]

## 2020-10-12 NOTE — NURSE NAVIGATOR
[address]  [facility] [place] [facility]     FROM:                                TO: Edgard Hoffman 54                                                      ([address] [facility] or Facility name)  Carson Moore 55  [address] [address]  Dept: [address]: [phone]                                      Room#:  171/11                                                       Nurse Navigator:  Bianca Cates RN         SITUATION      ASAScore: ASA 2 - Patient with mild systemic disease with no functional limitations    Admitted:  10/7/2020  Hospital Day: 3      Attending Provider:  No att. providers found     Consultations:  None    PCP:  Johnson Smith MD   [phone]     Admitting Dx:  Right knee DJD [M17.11]       Principal Problem:    Primary localized osteoarthritis of right knee (9/25/2020)    Active Problems:    Right knee DJD (10/7/2020)      5 Days Post-Op of   Procedure(s):  RIGHT TOTAL KNEE REPLACEMENT   BY: Jolie Goel MD             ON: 10/7/2020                  Code Status: Prior             Advance Directive? Yes Not W Pt (Send w/patient)     Isolation:  There are currently no Active Isolations       MDRO: No current active infections    BACKGROUND     Allergies:   Allergies   Allergen Reactions    Novocain [Procaine] Other (comments)     FAINTS       Past Medical History:   Diagnosis Date    Arthritis     Benign colon polyp 2020    Diabetes ([address]     Pre-Diabetes - Diet & Exercise Controlled    Diabetes mellitus     GERD (gastroesophageal reflux disease)     Headache(784.0)     High cholesterol     Hypertension     Neuropathy     Osteopenia        Past Surgical History:   Procedure Laterality Date    HX CARPAL TUNNEL RELEASE      HX CATARACT REMOVAL      BILATERAL    HX GI      COLONOSCOPY    HX LUMBAR LAMINECTOMY  2013    HX ORTHOPAEDIC Bilateral 1985    CARPAL ARIEL    HX ROTATOR CUFF REPAIR Left  HX TONSILLECTOMY         Prior to Admission Medications   Prescriptions Last Dose Informant Patient Reported? Taking? Calcium-Cholecalciferol, D3, (Calcium 600 with Vitamin D3) 600 mg(1,500mg) -400 unit cap 10/6/2020 at 1200  Yes Yes   Sig: Take  by mouth. Hydrochlorothiazide 12.5 mg tablet 10/6/2020 at 0800  Yes Yes   Sig: Take 12.5 mg by mouth daily. cholecalciferol, vitamin D3, (Vitamin D3) 50 mcg (2,000 unit) tab 10/6/2020 at 0800  Yes Yes   Sig: Take 4,000 Units by mouth daily. denosumab (Prolia) 60 mg/mL injection 2020  Yes No   Si mg by SubCUTAneous route every 6 months. diclofenac (VOLTAREN) 1 % gel 2020 at Unknown time  No Yes   Sig: Apply  to affected area four (4) times daily. Patient taking differently: Apply  to affected area four (4) times daily as needed. esomeprazole (NEXIUM) 40 mg capsule 10/7/2020 at 0500  Yes Yes   Sig: Take 40 mg by mouth daily. gabapentin (NEURONTIN) 300 mg capsule 10/7/2020 at 0500  No Yes   Sig: TAKE 2 CAPSULES BY MOUTH IN THE MORNING, AT NOON, AT SUPPER, AND AT BEDTIME   Patient taking differently: BY MOUTH IN THE MORNING, AT NOON, AT SUPPER, AND AT BEDTIME   lactase (LACTAID PO) 2020 at Unknown time  Yes Yes   Sig: Take  by mouth daily as needed. loratadine 10 mg cap 10/7/2020 at 0500  Yes Yes   Sig: Take 1 Tab by mouth daily as needed. losartan (COZAAR) 100 mg tablet 10/6/2020 at 98041  Yes Yes   Sig: Take 100 mg by mouth daily. metFORMIN (GLUCOPHAGE) 1,000 mg tablet 10/6/2020 at 0800  Yes Yes   Sig: Take 1,000 mg by mouth two (2) times daily (with meals). AFTER BREAKFAST AND AFTER SUPPER   multivit-min/iron/folic/lutein (CENTRUM SILVER WOMEN PO) 2020 at Unknown time  Yes Yes   Sig: Take 1 Tab by mouth daily. mupirocin (BACTROBAN) 2 % ointment 10/7/2020 at 0500  No Yes   Sig: by Both Nostrils route two (2) times a day for 7 days.    mupirocin (BACTROBAN) 2 % ointment 10/7/2020 at 0500  Yes Yes   Sig: by Both Nostrils route two (2) times a day. Indications: MSSA   rosuvastatin (CRESTOR) 20 mg tablet 10/6/2020 at 2000  Yes Yes   Sig: Take 20 mg by mouth nightly. Facility-Administered Medications: None       Vaccinations: There is no immunization history on file for this patient. ASSESSMENT   Age: 80 y.o. Gender: female        Height: Height: 5' 5.5\" (166.4 cm)                    Weight:Weight: 70.4 kg (155 lb 3.3 oz)     No data found. Active Orders   There are no active orders of the following types: Diet. Orientation: Orientation Level: Oriented X4    Active Lines/Drains:  (Peg Tube / Mcconnell / CL or S/L?):no    Urinary Status: Voiding      Last BM: Last Bowel Movement Date: 10/07/20(per patient no BM in two days)     Skin Integrity: Incision (comment), Intact(aquacel with scant drainage R knee)             Mobility: Slightly limited   Weight Bearing Status: WBAT (Weight Bearing as Tolerated)      Gait Training  Assistive Device: Gait belt, Walker, rolling  Ambulation - Level of Assistance: Modified independent, Adaptive equipment, Additional time(supervision for safety in hospital environment)  Distance (ft): 100 Feet (ft)  Stairs - Level of Assistance: Supervision, Additional time, Adaptive equipment  Number of Stairs Trained: 4  Rail Use: Both  Interventions: Safety awareness training, Tactile cues, Verbal cues, Visual/Demos     On Anticoagulation?  YES  Aspirin                                         Pain Medications given:  Oxycodone                                     Lab Results   Component Value Date/Time    Glucose 105 (H) 10/08/2020 02:21 AM    Hemoglobin A1c 5.7 (H) 09/29/2020 12:28 PM    INR 1.0 09/29/2020 12:28 PM    INR 1.0 02/15/2013 12:17 PM    HGB 9.5 (L) 10/08/2020 02:21 AM    HGB 11.3 (L) 09/29/2020 12:28 PM    HGB 10.9 (L) 03/01/2013 03:45 AM    HGB 10.3 (L) 02/28/2013 03:50 AM       Readmission Risks:  Score:         RECOMMENDATION     See After Visit Summary (AVS) for:  · Discharge instructions  · After [address]   · Medication Reconciliation          [facility]edic Nurse Navigator  JOCELYNN Adhikari, RN-BC       Office  [phone]  Cell      [phone]  Fax      [phone]  [email or web address]             . Phy

## 2020-10-13 ENCOUNTER — PATIENT OUTREACH (OUTPATIENT)
Dept: CASE MANAGEMENT | Age: 85
End: 2020-10-13

## 2020-10-13 NOTE — PROGRESS NOTES
This note will not be viewable in [address] Post Discharge Follow-up contact after Joint Replacement    Patient discharged on 10/9/20  By  Johnny Miller   following  right knee Arthroplasty. Spoke with patient today, who reports they are \" glad to be home. \"  Denies Fever, Shortness of Breath or Chest Pain. Home Health has visited. Patient also reports:  Incision  clean, dry, intact  Calf is non-tender, operative extremity has moderate swelling. Pain is well managed. Discussed use of ice & elevation. Patient is progressing with therapy and is exercising independently. Taking Aspirin for anticoagulation, oxycodone for pain. Patient is not experiencing symptoms of constipation & urinating without difficulty. Discussed side effects of anticoagulants & pain medications (bleeding/bruising, constipation, lightheaded/dizziness)  Follow up appointment is scheduled for 10/19. Discussed calling surgeon Dr Fan Gaxiola  for drainage, bleeding, swelling in operative extremity, fever or pain. Discussed calling PCP Dr Adriana Hashimoto with other medical issues.

## 2021-03-15 ENCOUNTER — OFFICE VISIT (OUTPATIENT)
Dept: NEUROLOGY | Age: 86
End: 2021-03-15
Payer: MEDICARE

## 2021-03-15 VITALS
SYSTOLIC BLOOD PRESSURE: 158 MMHG | DIASTOLIC BLOOD PRESSURE: 80 MMHG | TEMPERATURE: 97.2 F | OXYGEN SATURATION: 98 % | RESPIRATION RATE: 14 BRPM | HEART RATE: 61 BPM | BODY MASS INDEX: 25.4 KG/M2 | WEIGHT: 155 LBS

## 2021-03-15 DIAGNOSIS — M79.2 NEUROPATHIC PAIN: Primary | ICD-10-CM

## 2021-03-15 DIAGNOSIS — G60.9 HEREDITARY AND IDIOPATHIC PERIPHERAL NEUROPATHY: ICD-10-CM

## 2021-03-15 PROCEDURE — 99213 OFFICE O/P EST LOW 20 MIN: CPT | Performed by: PSYCHIATRY & NEUROLOGY

## 2021-03-15 PROCEDURE — G8427 DOCREV CUR MEDS BY ELIG CLIN: HCPCS | Performed by: PSYCHIATRY & NEUROLOGY

## 2021-03-15 PROCEDURE — 1101F PT FALLS ASSESS-DOCD LE1/YR: CPT | Performed by: PSYCHIATRY & NEUROLOGY

## 2021-03-15 PROCEDURE — G8536 NO DOC ELDER MAL SCRN: HCPCS | Performed by: PSYCHIATRY & NEUROLOGY

## 2021-03-15 PROCEDURE — 1090F PRES/ABSN URINE INCON ASSESS: CPT | Performed by: PSYCHIATRY & NEUROLOGY

## 2021-03-15 PROCEDURE — G8432 DEP SCR NOT DOC, RNG: HCPCS | Performed by: PSYCHIATRY & NEUROLOGY

## 2021-03-15 PROCEDURE — G8419 CALC BMI OUT NRM PARAM NOF/U: HCPCS | Performed by: PSYCHIATRY & NEUROLOGY

## 2021-03-15 NOTE — PROGRESS NOTES
Chief Complaint   Patient presents with    Peripheral Neuropathy     No change. States neuropathy comes and goes.

## 2021-03-15 NOTE — PROGRESS NOTES
[facility] and [address] at [facility] Clinics at [address] [facility], [address] [facility] [address] [facility], [address] [place] [address]   [phone]              Chief Complaint   Patient presents with    Peripheral Neuropathy     Current Outpatient Medications   Medication Sig Dispense Refill    Calcium-Cholecalciferol, D3, (Calcium 600 with Vitamin D3) 600 mg(1,500mg) -400 unit cap Take  by mouth.  metFORMIN (GLUCOPHAGE) 1,000 mg tablet Take 1,000 mg by mouth two (2) times daily (with meals). AFTER BREAKFAST AND AFTER SUPPER      lactase (LACTAID PO) Take  by mouth daily as needed.  multivit-min/iron/folic/lutein (CENTRUM SILVER WOMEN PO) Take 1 Tab by mouth daily.  cholecalciferol, vitamin D3, (Vitamin D3) 50 mcg (2,000 unit) tab Take 4,000 Units by mouth daily.  loratadine 10 mg cap Take 1 Tab by mouth daily as needed.  denosumab (Prolia) 60 mg/mL injection 60 mg by SubCUTAneous route every 6 months.  gabapentin (NEURONTIN) 300 mg capsule TAKE 2 CAPSULES BY MOUTH IN THE MORNING, AT NOON, AT SUPPER, AND AT BEDTIME (Patient taking differently: Take 300 mg by mouth four (4) times daily.) 720 Cap 5    diclofenac (VOLTAREN) 1 % gel Apply  to affected area four (4) times daily. (Patient taking differently: Apply  to affected area four (4) times daily as needed.) 2 Each 0    esomeprazole (NEXIUM) 40 mg capsule Take 40 mg by mouth daily.  rosuvastatin (CRESTOR) 20 mg tablet Take 20 mg by mouth nightly.  Hydrochlorothiazide 12.5 mg tablet Take 12.5 mg by mouth daily.  losartan (COZAAR) 100 mg tablet Take 100 mg by mouth daily.           Allergies   Allergen Reactions    Novocain [Procaine] Other (comments)     FAINTS     Social History     Tobacco Use    Smoking status: Former Smoker     Packs/day: 1.50     Years: 40.00     Pack years: 60.00     Quit date: 6/27/2000 Years since quittin.7    Smokeless tobacco: Never Used   Substance Use Topics    Alcohol use: Yes     Alcohol/week: 0.8 standard drinks     Types: 1 Glasses of wine per week    Drug use: No     71-year-old lady returns today for follow-up peripheral neuropathy. She is maintained on Neurontin. She notes the medicine has her symptoms tolerable at present. She got right sided knee replacement in October and did very well with that. Examination  Visit Vitals  BP (!) 158/80 (BP 1 Location: Left upper arm, BP Patient Position: Sitting, BP Cuff Size: Adult)   Pulse 61   Temp 97.2 °F (36.2 °C)   Resp 14   Wt 70.3 kg (155 lb)   SpO2 98%   BMI 25.40 kg/m²   She is a very pleasant lady engaging and interactive. Completely oriented. Normal speech and language. Mild sensory loss in the lower extremities. Impression/Plan  Peripheral neuropathy doing well  Continue Neurontin  We will see her back in 6 months and she has her appointment set for September 15    My Altman MD        This note was created using voice recognition software. Despite editing, there may be syntax errors.

## 2021-03-17 DIAGNOSIS — M79.2 NEUROPATHIC PAIN: ICD-10-CM

## 2021-03-17 RX ORDER — GABAPENTIN 300 MG/1
CAPSULE ORAL
Qty: 720 CAP | Refills: 5 | Status: SHIPPED | OUTPATIENT
Start: 2021-03-17 | End: 2021-11-09

## 2021-10-29 DIAGNOSIS — M79.2 NEUROPATHIC PAIN: ICD-10-CM

## 2021-11-09 RX ORDER — GABAPENTIN 300 MG/1
CAPSULE ORAL
Qty: 720 CAPSULE | Refills: 5 | Status: SHIPPED | OUTPATIENT
Start: 2021-11-09 | End: 2022-05-14

## 2021-11-29 ENCOUNTER — OFFICE VISIT (OUTPATIENT)
Dept: NEUROLOGY | Age: 86
End: 2021-11-29
Payer: MEDICARE

## 2021-11-29 VITALS
TEMPERATURE: 97.8 F | DIASTOLIC BLOOD PRESSURE: 68 MMHG | WEIGHT: 152.5 LBS | BODY MASS INDEX: 24.99 KG/M2 | OXYGEN SATURATION: 97 % | HEART RATE: 54 BPM | SYSTOLIC BLOOD PRESSURE: 138 MMHG

## 2021-11-29 DIAGNOSIS — M79.2 NEUROPATHIC PAIN: Primary | ICD-10-CM

## 2021-11-29 DIAGNOSIS — G60.9 HEREDITARY AND IDIOPATHIC PERIPHERAL NEUROPATHY: ICD-10-CM

## 2021-11-29 PROCEDURE — 1101F PT FALLS ASSESS-DOCD LE1/YR: CPT | Performed by: PSYCHIATRY & NEUROLOGY

## 2021-11-29 PROCEDURE — 99213 OFFICE O/P EST LOW 20 MIN: CPT | Performed by: PSYCHIATRY & NEUROLOGY

## 2021-11-29 PROCEDURE — 1090F PRES/ABSN URINE INCON ASSESS: CPT | Performed by: PSYCHIATRY & NEUROLOGY

## 2021-11-29 PROCEDURE — G8536 NO DOC ELDER MAL SCRN: HCPCS | Performed by: PSYCHIATRY & NEUROLOGY

## 2021-11-29 PROCEDURE — G8427 DOCREV CUR MEDS BY ELIG CLIN: HCPCS | Performed by: PSYCHIATRY & NEUROLOGY

## 2021-11-29 PROCEDURE — G8432 DEP SCR NOT DOC, RNG: HCPCS | Performed by: PSYCHIATRY & NEUROLOGY

## 2021-11-29 PROCEDURE — G8420 CALC BMI NORM PARAMETERS: HCPCS | Performed by: PSYCHIATRY & NEUROLOGY

## 2021-11-29 NOTE — PROGRESS NOTES
Chief Complaint   Patient presents with    Follow-up     Follow up neuropathy, no changes     Visit Vitals  /68 (BP 1 Location: Left arm, BP Patient Position: Sitting, BP Cuff Size: Adult)   Pulse (!) 54   Temp 97.8 °F (36.6 °C) (Temporal)   Wt 69.2 kg (152 lb 8 oz)   SpO2 97%   BMI 24.99 kg/m²

## 2021-11-29 NOTE — PROGRESS NOTES
[facility] Neurology Clinics and  [address] at [facility] Neurology Clinics at [address] [facility], [address] [facility] [address] [facility], [address] [place] [address]   [phone]              Chief Complaint   Patient presents with    Follow-up     Follow up neuropathy, no changes     Current Outpatient Medications   Medication Sig Dispense Refill    gabapentin (NEURONTIN) 300 mg capsule TAKE 2 (TWO) CAPSULES BY MOUTH every morning, NOON, SUPPER AND AT bedtime 720 Capsule 5    Calcium-Cholecalciferol, D3, (Calcium 600 with Vitamin D3) 600 mg(1,500mg) -400 unit cap Take  by mouth.  metFORMIN (GLUCOPHAGE) 1,000 mg tablet Take 1,000 mg by mouth two (2) times daily (with meals). AFTER BREAKFAST AND AFTER SUPPER      lactase (LACTAID PO) Take  by mouth daily as needed.  multivit-min/iron/folic/lutein (CENTRUM SILVER WOMEN PO) Take 1 Tab by mouth daily.  cholecalciferol, vitamin D3, (Vitamin D3) 50 mcg (2,000 unit) tab Take 4,000 Units by mouth daily.  loratadine 10 mg cap Take 1 Tab by mouth daily as needed.  denosumab (Prolia) 60 mg/mL injection 60 mg by SubCUTAneous route every 6 months.  diclofenac (VOLTAREN) 1 % gel Apply  to affected area four (4) times daily. (Patient taking differently: Apply  to affected area four (4) times daily as needed.) 2 Each 0    esomeprazole (NEXIUM) 40 mg capsule Take 40 mg by mouth daily.  rosuvastatin (CRESTOR) 20 mg tablet Take 20 mg by mouth nightly.  Hydrochlorothiazide 12.5 mg tablet Take 12.5 mg by mouth daily.  losartan (COZAAR) 100 mg tablet Take 100 mg by mouth daily.           Allergies   Allergen Reactions    Novocain [Procaine] Other (comments)     FAINTS     Social History     Tobacco Use    Smoking status: Former Smoker     Packs/day: 1.50     Years: 40.00     Pack years: 60.00     Quit date: 2000     Years since quittin.4    Smokeless tobacco: Never Used   Substance Use Topics    Alcohol use: Yes     Alcohol/week: 0.8 standard drinks     Types: 1 Glasses of wine per week    Drug use: No     78-year-old lady returns today for follow-up peripheral neuropathy. She is maintained on Neurontin. Her last visit with me was in March. Today she reports her symptoms are about the same. Tolerating Neurontin    Examination  Visit Vitals  /68 (BP 1 Location: Left arm, BP Patient Position: Sitting, BP Cuff Size: Adult)   Pulse (!) 54   Temp 97.8 °F (36.6 °C) (Temporal)   Wt 69.2 kg (152 lb 8 oz)   SpO2 97%   BMI 24.99 kg/m²     She is a very pleasant lady. She is awake alert oriented and conversant. She has normal speech language and cognition. Graded sensory loss in the bilateral lower extremities. Depressed reflexes with no ankle jerks. Impression/Plan  Peripheral neuropathy stable  Continue Neurontin  Follow in 6 months    Yoselin Stoner MD        This note was created using voice recognition software. Despite editing, there may be syntax errors.

## 2022-03-20 PROBLEM — M17.11 PRIMARY LOCALIZED OSTEOARTHRITIS OF RIGHT KNEE: Status: ACTIVE | Noted: 2020-09-25

## 2022-03-20 PROBLEM — M17.11 RIGHT KNEE DJD: Status: ACTIVE | Noted: 2020-10-07

## 2022-05-13 DIAGNOSIS — M79.2 NEUROPATHIC PAIN: ICD-10-CM

## 2022-05-14 RX ORDER — GABAPENTIN 300 MG/1
CAPSULE ORAL
Qty: 720 CAPSULE | Refills: 5 | Status: SHIPPED | OUTPATIENT
Start: 2022-05-14

## 2022-11-16 DIAGNOSIS — M79.2 NEUROPATHIC PAIN: ICD-10-CM

## 2022-11-16 RX ORDER — GABAPENTIN 300 MG/1
CAPSULE ORAL
Qty: 720 CAPSULE | Refills: 5 | Status: SHIPPED | OUTPATIENT
Start: 2022-11-16

## 2023-05-26 RX ORDER — DENOSUMAB 60 MG/ML
60 INJECTION SUBCUTANEOUS
COMMUNITY

## 2023-05-26 RX ORDER — LOSARTAN POTASSIUM 100 MG/1
100 TABLET ORAL DAILY
COMMUNITY

## 2023-05-26 RX ORDER — ESOMEPRAZOLE MAGNESIUM 40 MG/1
40 CAPSULE, DELAYED RELEASE ORAL DAILY
COMMUNITY

## 2023-05-26 RX ORDER — ROSUVASTATIN CALCIUM 20 MG/1
20 TABLET, COATED ORAL NIGHTLY
COMMUNITY

## 2023-05-26 RX ORDER — HYDROCHLOROTHIAZIDE 12.5 MG/1
12.5 TABLET ORAL DAILY
COMMUNITY

## 2023-05-26 RX ORDER — GABAPENTIN 300 MG/1
CAPSULE ORAL
COMMUNITY
Start: 2022-11-16

## 2023-05-26 RX ORDER — LORATADINE 10 MG/1
1 CAPSULE, LIQUID FILLED ORAL DAILY PRN
COMMUNITY

## (undated) DEVICE — SUTURE VCRL SZ 2-0 L36IN ABSRB UD L40MM CT 1/2 CIR J957H

## (undated) DEVICE — STRAP,POSITIONING,KNEE/BODY,FOAM,4X60": Brand: MEDLINE

## (undated) DEVICE — STRYKER PERFORMANCE SERIES SAGITTAL BLADE: Brand: STRYKER PERFORMANCE SERIES

## (undated) DEVICE — Z DISCONTINUED USE 2744636  DRESSING AQUACEL 14 IN ALG W3.5XL14IN POLYUR FLM CVR W/ HYDRCOLL

## (undated) DEVICE — SOLUTION IRRIG 3000ML 0.9% SOD CHL FLX CONT 0797208] ICU MEDICAL INC]

## (undated) DEVICE — Device

## (undated) DEVICE — INFECTION CONTROL KIT SYS

## (undated) DEVICE — STERILE POLYISOPRENE POWDER-FREE SURGICAL GLOVES: Brand: PROTEXIS

## (undated) DEVICE — HANDLE LT SNAP ON ULT DURABLE LENS FOR TRUMPF ALC DISPOSABLE

## (undated) DEVICE — 4-PORT MANIFOLD: Brand: NEPTUNE 2

## (undated) DEVICE — BANDAGE COMPR M W6INXL10YD WHT BGE VELC E MTRX HK AND LOOP

## (undated) DEVICE — SUTURE STRATAFIX SYMMETRIC PDS + SZ 1 L18IN ABSRB VLT L48MM SXPP1A400

## (undated) DEVICE — TAPE,CLOTH/SILK,CURAD,3"X10YD,LF,40/CS: Brand: CURAD

## (undated) DEVICE — NEEDLE HYPO 22GA L1.5IN BLK S STL HUB POLYPR SHLD REG BVL

## (undated) DEVICE — T4 HOOD

## (undated) DEVICE — HANDPIECE SET WITH BONE CLEANING TIP AND SUCTION TUBE: Brand: INTERPULSE

## (undated) DEVICE — 3M™ IOBAN™ 2 ANTIMICROBIAL INCISE DRAPE 6651EZ: Brand: IOBAN™ 2

## (undated) DEVICE — SUTURE VCRL SZ 1 L36IN ABSRB UD L36MM CT-1 1/2 CIR J947H

## (undated) DEVICE — REM POLYHESIVE ADULT PATIENT RETURN ELECTRODE: Brand: VALLEYLAB

## (undated) DEVICE — TOTAL TRAY, 16FR 10ML SIL FOLEY, URN: Brand: MEDLINE

## (undated) DEVICE — STERILE POLYISOPRENE POWDER-FREE SURGICAL GLOVES WITH EMOLLIENT COATING: Brand: PROTEXIS

## (undated) DEVICE — PREP SKN CHLRAPRP APL 26ML STR --

## (undated) DEVICE — CONTAINER,SPECIMEN,3OZ,OR STRL: Brand: MEDLINE

## (undated) DEVICE — SCRUB DRY SURG EZ SCRUB BRUSH PREOPERATIVE GRN

## (undated) DEVICE — CUSTOM CAST PD STR

## (undated) DEVICE — DRAPE,EXTREMITY,89X128,STERILE: Brand: MEDLINE

## (undated) DEVICE — GARMENT,MEDLINE,DVT,INT,CALF,MED, GEN2: Brand: MEDLINE

## (undated) DEVICE — SYR 20ML LL STRL LF --

## (undated) DEVICE — CARTRIDGE BNE CEM MIX UNIV TWR VAC ROTOR BRK OFF NOZ W/O

## (undated) DEVICE — SOLUTION IRRIG 1000ML H2O STRL BLT